# Patient Record
Sex: FEMALE | Race: WHITE | Employment: UNEMPLOYED | URBAN - METROPOLITAN AREA
[De-identification: names, ages, dates, MRNs, and addresses within clinical notes are randomized per-mention and may not be internally consistent; named-entity substitution may affect disease eponyms.]

---

## 2017-02-15 ENCOUNTER — HOSPITAL ENCOUNTER (OUTPATIENT)
Age: 22
Setting detail: SPECIMEN
Discharge: HOME | End: 2017-02-15
Payer: COMMERCIAL

## 2017-02-18 LAB
CULTURE: NORMAL
CULTURE: NORMAL
Lab: NORMAL
SPECIMEN DESCRIPTION: NORMAL
STATUS: NORMAL

## 2018-02-04 ENCOUNTER — APPOINTMENT (OUTPATIENT)
Dept: GENERAL RADIOLOGY | Age: 23
End: 2018-02-04
Payer: MEDICAID

## 2018-02-04 ENCOUNTER — APPOINTMENT (OUTPATIENT)
Dept: CT IMAGING | Age: 23
End: 2018-02-04
Payer: MEDICAID

## 2018-02-04 ENCOUNTER — HOSPITAL ENCOUNTER (EMERGENCY)
Age: 23
Discharge: HOME | End: 2018-02-04
Attending: EMERGENCY MEDICINE
Payer: MEDICAID

## 2018-02-04 VITALS
HEART RATE: 87 BPM | BODY MASS INDEX: 29.09 KG/M2 | OXYGEN SATURATION: 100 % | WEIGHT: 181 LBS | DIASTOLIC BLOOD PRESSURE: 99 MMHG | HEIGHT: 66 IN | RESPIRATION RATE: 16 BRPM | SYSTOLIC BLOOD PRESSURE: 140 MMHG | TEMPERATURE: 98.2 F

## 2018-02-04 DIAGNOSIS — R07.89 ATYPICAL CHEST PAIN: Primary | ICD-10-CM

## 2018-02-04 LAB
ABSOLUTE EOS #: 0.7 K/UL (ref 0–0.4)
ABSOLUTE IMMATURE GRANULOCYTE: ABNORMAL K/UL (ref 0–0.3)
ABSOLUTE LYMPH #: 2.6 K/UL (ref 1–4.8)
ABSOLUTE MONO #: 0.5 K/UL (ref 0.2–0.8)
ANION GAP SERPL CALCULATED.3IONS-SCNC: 12 MMOL/L (ref 9–17)
BASOPHILS # BLD: 0 % (ref 0–2)
BASOPHILS ABSOLUTE: 0 K/UL (ref 0–0.2)
BUN BLDV-MCNC: 12 MG/DL (ref 6–20)
BUN/CREAT BLD: 19 (ref 9–20)
CALCIUM SERPL-MCNC: 9.2 MG/DL (ref 8.6–10.4)
CHLORIDE BLD-SCNC: 102 MMOL/L (ref 98–107)
CHP ED QC CHECK: NORMAL
CO2: 24 MMOL/L (ref 20–31)
CREAT SERPL-MCNC: 0.63 MG/DL (ref 0.5–0.9)
D-DIMER QUANTITATIVE: 0.58 MG/L FEU
DIFFERENTIAL TYPE: ABNORMAL
EKG ATRIAL RATE: 78 BPM
EKG P AXIS: 68 DEGREES
EKG P-R INTERVAL: 154 MS
EKG Q-T INTERVAL: 396 MS
EKG QRS DURATION: 120 MS
EKG QTC CALCULATION (BAZETT): 451 MS
EKG R AXIS: -8 DEGREES
EKG T AXIS: 61 DEGREES
EKG VENTRICULAR RATE: 78 BPM
EOSINOPHILS RELATIVE PERCENT: 7 % (ref 1–4)
GFR AFRICAN AMERICAN: >60 ML/MIN
GFR NON-AFRICAN AMERICAN: >60 ML/MIN
GFR SERPL CREATININE-BSD FRML MDRD: ABNORMAL ML/MIN/{1.73_M2}
GFR SERPL CREATININE-BSD FRML MDRD: ABNORMAL ML/MIN/{1.73_M2}
GLUCOSE BLD-MCNC: 109 MG/DL (ref 70–99)
HCT VFR BLD CALC: 37.3 % (ref 36–46)
HEMOGLOBIN: 12.3 G/DL (ref 12–16)
IMMATURE GRANULOCYTES: ABNORMAL %
LYMPHOCYTES # BLD: 26 % (ref 25–45)
MCH RBC QN AUTO: 28.5 PG (ref 26–34)
MCHC RBC AUTO-ENTMCNC: 33.1 G/DL (ref 31–37)
MCV RBC AUTO: 86.1 FL (ref 80–100)
MONOCYTES # BLD: 5 % (ref 2–8)
NRBC AUTOMATED: ABNORMAL PER 100 WBC
PDW BLD-RTO: 15.5 % (ref 11.5–14.5)
PLATELET # BLD: 390 K/UL (ref 130–400)
PLATELET ESTIMATE: ABNORMAL
PMV BLD AUTO: 7.5 FL (ref 6–12)
POTASSIUM SERPL-SCNC: 3.6 MMOL/L (ref 3.7–5.3)
PREGNANCY TEST URINE, POC: NORMAL
RBC # BLD: 4.33 M/UL (ref 4–5.2)
RBC # BLD: ABNORMAL 10*6/UL
SEG NEUTROPHILS: 62 % (ref 34–64)
SEGMENTED NEUTROPHILS ABSOLUTE COUNT: 6.1 K/UL (ref 1.8–7.7)
SODIUM BLD-SCNC: 138 MMOL/L (ref 135–144)
TROPONIN INTERP: NORMAL
TROPONIN T: <0.03 NG/ML
WBC # BLD: 9.9 K/UL (ref 4.5–13.5)
WBC # BLD: ABNORMAL 10*3/UL

## 2018-02-04 PROCEDURE — 6370000000 HC RX 637 (ALT 250 FOR IP): Performed by: EMERGENCY MEDICINE

## 2018-02-04 PROCEDURE — 81003 URINALYSIS AUTO W/O SCOPE: CPT

## 2018-02-04 PROCEDURE — 84703 CHORIONIC GONADOTROPIN ASSAY: CPT

## 2018-02-04 PROCEDURE — 85025 COMPLETE CBC W/AUTO DIFF WBC: CPT

## 2018-02-04 PROCEDURE — 85379 FIBRIN DEGRADATION QUANT: CPT

## 2018-02-04 PROCEDURE — 93005 ELECTROCARDIOGRAM TRACING: CPT

## 2018-02-04 PROCEDURE — 71046 X-RAY EXAM CHEST 2 VIEWS: CPT

## 2018-02-04 PROCEDURE — 80048 BASIC METABOLIC PNL TOTAL CA: CPT

## 2018-02-04 PROCEDURE — 84484 ASSAY OF TROPONIN QUANT: CPT

## 2018-02-04 PROCEDURE — 6360000004 HC RX CONTRAST MEDICATION: Performed by: EMERGENCY MEDICINE

## 2018-02-04 PROCEDURE — 2580000003 HC RX 258: Performed by: EMERGENCY MEDICINE

## 2018-02-04 PROCEDURE — 71260 CT THORAX DX C+: CPT

## 2018-02-04 PROCEDURE — 99285 EMERGENCY DEPT VISIT HI MDM: CPT

## 2018-02-04 RX ORDER — SODIUM CHLORIDE 0.9 % (FLUSH) 0.9 %
10 SYRINGE (ML) INJECTION PRN
Status: DISCONTINUED | OUTPATIENT
Start: 2018-02-04 | End: 2018-02-04 | Stop reason: HOSPADM

## 2018-02-04 RX ORDER — 0.9 % SODIUM CHLORIDE 0.9 %
50 INTRAVENOUS SOLUTION INTRAVENOUS ONCE
Status: COMPLETED | OUTPATIENT
Start: 2018-02-04 | End: 2018-02-04

## 2018-02-04 RX ORDER — IBUPROFEN 600 MG/1
600 TABLET ORAL EVERY 6 HOURS PRN
Qty: 15 TABLET | Refills: 0 | Status: SHIPPED | OUTPATIENT
Start: 2018-02-04 | End: 2019-05-28

## 2018-02-04 RX ORDER — 0.9 % SODIUM CHLORIDE 0.9 %
500 INTRAVENOUS SOLUTION INTRAVENOUS ONCE
Status: DISCONTINUED | OUTPATIENT
Start: 2018-02-04 | End: 2018-02-04 | Stop reason: HOSPADM

## 2018-02-04 RX ORDER — IBUPROFEN 800 MG/1
800 TABLET ORAL ONCE
Status: COMPLETED | OUTPATIENT
Start: 2018-02-04 | End: 2018-02-04

## 2018-02-04 RX ADMIN — SODIUM CHLORIDE 50 ML: 9 INJECTION, SOLUTION INTRAVENOUS at 15:55

## 2018-02-04 RX ADMIN — IBUPROFEN 800 MG: 800 TABLET, FILM COATED ORAL at 14:10

## 2018-02-04 RX ADMIN — IOPAMIDOL 80 ML: 755 INJECTION, SOLUTION INTRAVENOUS at 15:55

## 2018-02-04 ASSESSMENT — ENCOUNTER SYMPTOMS
SHORTNESS OF BREATH: 1
GASTROINTESTINAL NEGATIVE: 1
WHEEZING: 0
COUGH: 1

## 2018-02-04 ASSESSMENT — PAIN SCALES - GENERAL: PAINLEVEL_OUTOF10: 6

## 2018-02-04 NOTE — ED PROVIDER NOTES
for level 4, 10 or more for level 5)     Review of Systems   HENT: Negative. Respiratory: Positive for cough and shortness of breath. Negative for wheezing. Cardiovascular: Positive for chest pain. Negative for palpitations and leg swelling. Gastrointestinal: Negative. Musculoskeletal: Negative. All other systems reviewed and are negative. Except as noted above the remainder of the review of systems was reviewed and negative. PHYSICAL EXAM    (up to 7 for level 4, 8 or more for level 5)     ED Triage Vitals [02/04/18 1331]   BP Temp Temp Source Pulse Resp SpO2 Height Weight   (!) 140/99 98.2 °F (36.8 °C) Oral 87 16 100 % 5' 6\" (1.676 m) 181 lb (82.1 kg)       Physical Exam   Constitutional: She is oriented to person, place, and time. She appears well-developed and well-nourished. HENT:   Head: Normocephalic. Eyes: EOM are normal.   Neck: Normal range of motion. Neck supple. No thyromegaly present. Cardiovascular: Normal rate and regular rhythm. Pulmonary/Chest: Effort normal. No respiratory distress. She has no wheezes. She has no rales. Abdominal: Soft. There is no tenderness. Musculoskeletal: Normal range of motion. She exhibits no edema. Neurological: She is alert and oriented to person, place, and time. Nursing note and vitals reviewed. DIAGNOSTIC RESULTS     EKG: All EKG's are interpreted by the Emergency Department Physician who either signs or Co-signs this chart in the absence of a cardiologist.    EKG as interpreted myself: Normal sinus rhythm ventricular rate of 78, no acute ST segment abnormalities, normal MS and QT interval, nonspecific interventricular conduction abnormality. RADIOLOGY:   Non-plain film images such as CT, Ultrasound and MRI are read by the radiologist. Plain radiographic images are visualized and preliminarily interpreted by the emergency physician with the below findings:      No results found.   Interpretation per the Radiologist below,

## 2018-02-05 LAB — HCG, PREGNANCY URINE (POC): NEGATIVE

## 2018-02-07 ENCOUNTER — OFFICE VISIT (OUTPATIENT)
Dept: INTERNAL MEDICINE CLINIC | Age: 23
End: 2018-02-07

## 2018-02-07 VITALS
BODY MASS INDEX: 29.63 KG/M2 | HEIGHT: 66 IN | OXYGEN SATURATION: 98 % | DIASTOLIC BLOOD PRESSURE: 80 MMHG | HEART RATE: 84 BPM | SYSTOLIC BLOOD PRESSURE: 120 MMHG | WEIGHT: 184.4 LBS | RESPIRATION RATE: 21 BRPM

## 2018-02-07 DIAGNOSIS — Z72.0 TOBACCO ABUSE: ICD-10-CM

## 2018-02-07 DIAGNOSIS — J45.909 MODERATE ASTHMA WITHOUT COMPLICATION, UNSPECIFIED WHETHER PERSISTENT: Primary | ICD-10-CM

## 2018-02-07 DIAGNOSIS — Z71.6 TOBACCO ABUSE COUNSELING: ICD-10-CM

## 2018-02-07 DIAGNOSIS — F32.A ANXIETY AND DEPRESSION: ICD-10-CM

## 2018-02-07 DIAGNOSIS — F41.9 ANXIETY AND DEPRESSION: ICD-10-CM

## 2018-02-07 PROBLEM — Z3A.36 PREGNANCY WITH 36 COMPLETED WEEKS GESTATION: Status: ACTIVE | Noted: 2017-02-25

## 2018-02-07 PROCEDURE — 99212 OFFICE O/P EST SF 10 MIN: CPT | Performed by: FAMILY MEDICINE

## 2018-02-07 RX ORDER — BUPROPION HYDROCHLORIDE 100 MG/1
100 TABLET ORAL 2 TIMES DAILY
Qty: 60 TABLET | Refills: 3 | Status: SHIPPED | OUTPATIENT
Start: 2018-02-07 | End: 2019-05-28

## 2018-02-07 RX ORDER — FLUTICASONE FUROATE AND VILANTEROL 200; 25 UG/1; UG/1
1 POWDER RESPIRATORY (INHALATION) DAILY
Qty: 1 EACH | Refills: 0 | COMMUNITY
Start: 2018-02-07 | End: 2019-05-28

## 2018-02-07 ASSESSMENT — ENCOUNTER SYMPTOMS
COUGH: 1
EYES NEGATIVE: 1
GASTROINTESTINAL NEGATIVE: 1

## 2018-02-07 NOTE — PROGRESS NOTES
warm and dry. Psychiatric:   Anxiety with underlying depression, panic attack, hyperventilation syndrome. She denies suicidality, delusion or hallucination     Vitals reviewed. Assessment:      1. Moderate asthma without complication, unspecified whether persistent     2. Anxiety and depression     3. Tobacco abuse     4. Tobacco abuse counseling             Plan:      71-year-old female mother of 2 children who recently quit her job to take care of her children is presented for ER follow-up. Patient states that she was seen in the emergency room with subjective complaint of shortness of breath. She had a CTA of the chest that was unremarkable. Tobacco use with history suggestive of asthma. I strongly advised her to quit tobacco, quit date, alternatives to consider. I have placed on bed. An provided sample of inhaled corticosteroids  Anxiety/depression  Panic attack  Hyperventilation syndrome  Insufficient support [place]. Patient stated that she lives with her boyfriend with modest means. Has 2 children the second one was born last year he did she says that she has quit her job and take care of her children. She is in process with Department of job and family services for state benefits. She be scheduled with [facility], comply with Wellbutrin, quit tobacco.  She would benefit from low-fat high-fiber diet, daily moderate exercise, lifestyle change, improved sleep hygiene  She denies excessive alcohol or spontaneous  Recent labs reviewed, discussed with patient question answered  Observe and call for any concern. She may go to the emergency room as necessary.   She may go to Department of job and family services for continuity of care as she wish  This note is created with a voice recognition program and while intend to generate a document that accurately reflects the content of the visit, no guarantee can be provided that every mistake has been identified and corrected by editing.

## 2018-02-07 NOTE — PROGRESS NOTES
routine dental cleaning in the past 6 months? yes -     Have you activated your [employer] account? If not, what are your barriers?  Yes     Patient Care Team:  Em Bocanegra MD as PCP - General (Family Medicine)  Janiya Louise MD as Obstetrician (Perinatology)    Current Tobacco Use    History   Smoking Status    Current Every Day Smoker    Packs/day: 0.50    Types: Cigarettes   Smokeless Tobacco    Never Used     Comment: \"1/2pk/cigs/day\"     Ready to quit: No  Counseling given: Yes     Are you motivated to quit? - no  If yes, have you set a date to quite? - no    Have you tried any anti-smoking aids in the past? -  yes -      1-800-QUIT-NOW ([phone])     Medical History Review  Past Medical, Family, and Social History reviewed and does contribute to the patient presenting condition    Health Maintenance   Topic Date Due    Chlamydia screen  03/11/2019 (Originally 8/17/2017)    Meningococcal (MCV) Vaccine Age 0-22 Years (1 of 1) 03/12/2019 (Originally 3/29/2012)    Flu vaccine (1) 03/12/2019 (Originally 9/1/2017)    DTaP/Tdap/Td vaccine (1 - Tdap) 03/19/2019 (Originally 3/29/2015)    Pneumococcal med risk (1 of 1 - PPSV23) 03/19/2019 (Originally 3/29/2015)    Cervical cancer screen  08/17/2019    HIV screen  Completed

## 2018-06-30 ENCOUNTER — HOSPITAL ENCOUNTER (EMERGENCY)
Age: 23
Discharge: HOME | End: 2018-06-30
Attending: EMERGENCY MEDICINE
Payer: COMMERCIAL

## 2018-06-30 ENCOUNTER — APPOINTMENT (OUTPATIENT)
Dept: GENERAL RADIOLOGY | Age: 23
End: 2018-06-30
Payer: COMMERCIAL

## 2018-06-30 VITALS
WEIGHT: 187.8 LBS | HEART RATE: 85 BPM | HEIGHT: 66 IN | DIASTOLIC BLOOD PRESSURE: 87 MMHG | RESPIRATION RATE: 16 BRPM | BODY MASS INDEX: 30.18 KG/M2 | TEMPERATURE: 98.7 F | OXYGEN SATURATION: 99 % | SYSTOLIC BLOOD PRESSURE: 120 MMHG

## 2018-06-30 DIAGNOSIS — K59.00 CONSTIPATION, UNSPECIFIED CONSTIPATION TYPE: Primary | ICD-10-CM

## 2018-06-30 PROCEDURE — 6370000000 HC RX 637 (ALT 250 FOR IP): Performed by: EMERGENCY MEDICINE

## 2018-06-30 PROCEDURE — 74018 RADEX ABDOMEN 1 VIEW: CPT

## 2018-06-30 PROCEDURE — 99283 EMERGENCY DEPT VISIT LOW MDM: CPT

## 2018-06-30 PROCEDURE — 84703 CHORIONIC GONADOTROPIN ASSAY: CPT

## 2018-06-30 RX ORDER — SODIUM PHOSPHATE, DIBASIC AND SODIUM PHOSPHATE, MONOBASIC 7; 19 G/133ML; G/133ML
118 ENEMA RECTAL ONCE
Status: COMPLETED | OUTPATIENT
Start: 2018-06-30 | End: 2018-06-30

## 2018-06-30 RX ORDER — POLYETHYLENE GLYCOL 3350 17 G/17G
17 POWDER, FOR SOLUTION ORAL DAILY
Qty: 1 BOTTLE | Refills: 0 | Status: SHIPPED | OUTPATIENT
Start: 2018-06-30 | End: 2018-07-07

## 2018-06-30 RX ORDER — DIAZEPAM 5 MG/1
5 TABLET ORAL EVERY 6 HOURS PRN
COMMUNITY
End: 2019-05-28

## 2018-06-30 RX ADMIN — SODIUM PHOSPHATE, DIBASIC AND SODIUM PHOSPHATE, MONOBASIC 1 ENEMA: 7; 19 ENEMA RECTAL at 17:26

## 2018-06-30 ASSESSMENT — PAIN DESCRIPTION - DESCRIPTORS: DESCRIPTORS: PRESSURE

## 2018-06-30 ASSESSMENT — ENCOUNTER SYMPTOMS
CONSTIPATION: 1
EYES NEGATIVE: 1
ALLERGIC/IMMUNOLOGIC NEGATIVE: 1
RESPIRATORY NEGATIVE: 1

## 2018-06-30 ASSESSMENT — PAIN DESCRIPTION - FREQUENCY: FREQUENCY: CONTINUOUS

## 2018-06-30 ASSESSMENT — PAIN SCALES - GENERAL: PAINLEVEL_OUTOF10: 2

## 2018-06-30 ASSESSMENT — PAIN DESCRIPTION - LOCATION: LOCATION: ABDOMEN

## 2018-06-30 ASSESSMENT — PAIN SCALES - WONG BAKER: WONGBAKER_NUMERICALRESPONSE: 2

## 2018-06-30 NOTE — ED PROVIDER NOTES
(Please note that portions of this note were completed with a voice recognition program.  Efforts were made to edit the dictations but occasionally words are mis-transcribed.)    South Wilson MD  Attending Emergency Physician         South Wilson MD  06/30/18 [id number]

## 2018-07-02 LAB
HCG, PREGNANCY URINE (POC): NEGATIVE
HCG, PREGNANCY URINE (POC): NEGATIVE

## 2018-10-26 ENCOUNTER — HOSPITAL ENCOUNTER (EMERGENCY)
Age: 23
Discharge: HOME | End: 2018-10-27
Payer: COMMERCIAL

## 2018-10-26 ENCOUNTER — HOSPITAL ENCOUNTER (EMERGENCY)
Age: 23
Discharge: HOME | End: 2018-10-26
Attending: EMERGENCY MEDICINE
Payer: COMMERCIAL

## 2018-10-26 VITALS
OXYGEN SATURATION: 100 % | WEIGHT: 192.44 LBS | RESPIRATION RATE: 18 BRPM | HEIGHT: 66 IN | DIASTOLIC BLOOD PRESSURE: 78 MMHG | BODY MASS INDEX: 30.93 KG/M2 | HEART RATE: 96 BPM | SYSTOLIC BLOOD PRESSURE: 142 MMHG | TEMPERATURE: 98.5 F

## 2018-10-26 VITALS
WEIGHT: 191.38 LBS | SYSTOLIC BLOOD PRESSURE: 156 MMHG | TEMPERATURE: 99.2 F | HEIGHT: 66 IN | BODY MASS INDEX: 30.76 KG/M2 | HEART RATE: 100 BPM | RESPIRATION RATE: 16 BRPM | OXYGEN SATURATION: 95 % | DIASTOLIC BLOOD PRESSURE: 80 MMHG

## 2018-10-26 DIAGNOSIS — N76.0 BV (BACTERIAL VAGINOSIS): Primary | ICD-10-CM

## 2018-10-26 DIAGNOSIS — A60.00 GENITAL HERPES SIMPLEX, UNSPECIFIED SITE: Primary | ICD-10-CM

## 2018-10-26 DIAGNOSIS — R03.0 ELEVATED BLOOD PRESSURE READING: ICD-10-CM

## 2018-10-26 DIAGNOSIS — B96.89 BV (BACTERIAL VAGINOSIS): Primary | ICD-10-CM

## 2018-10-26 LAB
-: ABNORMAL
AMORPHOUS: ABNORMAL
BACTERIA: ABNORMAL
BILIRUBIN URINE: NEGATIVE
CASTS UA: ABNORMAL /LPF
COLOR: YELLOW
COMMENT UA: ABNORMAL
CRYSTALS, UA: ABNORMAL /HPF
EPITHELIAL CELLS UA: ABNORMAL /HPF (ref 0–5)
GLUCOSE URINE: NEGATIVE
KETONES, URINE: NEGATIVE
LEUKOCYTE ESTERASE, URINE: ABNORMAL
MUCUS: ABNORMAL
NITRITE, URINE: NEGATIVE
OTHER OBSERVATIONS UA: ABNORMAL
PH UA: 6 (ref 5–8)
PROTEIN UA: ABNORMAL
RBC UA: ABNORMAL /HPF (ref 0–2)
RENAL EPITHELIAL, UA: ABNORMAL /HPF
SPECIFIC GRAVITY UA: 1.02 (ref 1–1.03)
TRICHOMONAS: ABNORMAL
TURBIDITY: CLEAR
URINE HGB: ABNORMAL
UROBILINOGEN, URINE: NORMAL
WBC UA: ABNORMAL /HPF (ref 0–5)
YEAST: ABNORMAL

## 2018-10-26 PROCEDURE — 84703 CHORIONIC GONADOTROPIN ASSAY: CPT

## 2018-10-26 PROCEDURE — 87491 CHLMYD TRACH DNA AMP PROBE: CPT

## 2018-10-26 PROCEDURE — 87086 URINE CULTURE/COLONY COUNT: CPT

## 2018-10-26 PROCEDURE — 87480 CANDIDA DNA DIR PROBE: CPT

## 2018-10-26 PROCEDURE — 87660 TRICHOMONAS VAGIN DIR PROBE: CPT

## 2018-10-26 PROCEDURE — 87255 GENET VIRUS ISOLATE HSV: CPT

## 2018-10-26 PROCEDURE — 87510 GARDNER VAG DNA DIR PROBE: CPT

## 2018-10-26 PROCEDURE — 99283 EMERGENCY DEPT VISIT LOW MDM: CPT

## 2018-10-26 PROCEDURE — 81001 URINALYSIS AUTO W/SCOPE: CPT

## 2018-10-26 PROCEDURE — 87015 SPECIMEN INFECT AGNT CONCNTJ: CPT

## 2018-10-26 PROCEDURE — 87591 N.GONORRHOEAE DNA AMP PROB: CPT

## 2018-10-26 RX ORDER — ACYCLOVIR 200 MG/1
200 CAPSULE ORAL
Qty: 50 CAPSULE | Refills: 0 | Status: SHIPPED | OUTPATIENT
Start: 2018-10-26 | End: 2018-11-05

## 2018-10-26 ASSESSMENT — ENCOUNTER SYMPTOMS
CONSTIPATION: 0
DIARRHEA: 0
NAUSEA: 0
VOMITING: 0
SHORTNESS OF BREATH: 0
ABDOMINAL PAIN: 0
COLOR CHANGE: 0
SORE THROAT: 0
SINUS PRESSURE: 0
RHINORRHEA: 0
WHEEZING: 0
COUGH: 0

## 2018-10-26 ASSESSMENT — PAIN DESCRIPTION - DESCRIPTORS: DESCRIPTORS: ACHING

## 2018-10-26 ASSESSMENT — PAIN DESCRIPTION - PAIN TYPE
TYPE: ACUTE PAIN
TYPE: ACUTE PAIN

## 2018-10-26 ASSESSMENT — PAIN SCALES - GENERAL
PAINLEVEL_OUTOF10: 2
PAINLEVEL_OUTOF10: 7

## 2018-10-26 NOTE — ED PROVIDER NOTES
[address] [place] [address] ED  eMERGENCY dEPARTMENT eNCOUnter      Pt Name: He Gonzales  MRN: [id number]  [place] 1995  Date of evaluation: 10/26/2018  Provider: Kalina Adames NP, CARLEEN - [facility] [address]       Chief Complaint   Patient presents with    Vaginal Itching         HISTORY OF PRESENT ILLNESS  (Location/Symptom, Timing/Onset, Context/Setting, Quality, Duration, Modifying Factors, Severity.)   He Gonzales is a 26 y.o. female who presents to the emergency department By private vehicle for evaluation of open sores to the shoulder area that are tender to palpation. The patient states last couple of patient's tetanus is open sores to her vagina that are tender. She describes it as a burning pain. She rates the pain a 7 year to 10 scale. No history of previous lesions in the past.      Nursing Notes were reviewed. ALLERGIES     Patient has no known allergies. CURRENT MEDICATIONS       Discharge Medication List as of 10/26/2018 10:38 AM      CONTINUE these medications which have NOT CHANGED    Details   diazepam (VALIUM) 5 MG tablet Take 5 mg by mouth every 6 hours as needed for Anxiety. Almer Marisa Historical Med      buPROPion (WELLBUTRIN) 100 MG tablet Take 1 tablet by mouth 2 times daily, Disp-60 tablet, R-3Normal      Fluticasone Furoate-Vilanterol (BREO ELLIPTA) 200-25 MCG/INH AEPB Inhale 1 puff into the lungs daily, Disp-1 each, [id number]  05/2019  [id number]      ibuprofen (ADVIL;MOTRIN) 600 MG tablet Take 1 tablet by mouth every 6 hours as needed for Pain, Disp-15 tablet, R-0Print      loratadine (CLARITIN) 10 MG tablet Take 1 tablet by mouth daily, Disp-30 tablet, R-0             PAST MEDICAL HISTORY   History reviewed. No pertinent past medical history. SURGICAL HISTORY           Procedure Laterality Date    TONSILLECTOMY      WISDOM TOOTH EXTRACTION           FAMILY HISTORY     History reviewed. No pertinent family history.   No family status information on Neurological: She is alert and oriented to person, place, and time. Skin: Skin is warm and dry. No rash noted. Psychiatric: She has a normal mood and affect. Vitals reviewed. LABS:  Labs Reviewed   HERPES SIMPLEX VIRUS CULTURE       All other labs were within normal range or not returned as of this dictation. EMERGENCY DEPARTMENT COURSE and DIFFERENTIAL DIAGNOSIS/MDM:   Vitals:    Vitals:    10/26/18 0952   BP: (!) 142/78   Pulse: 96   Resp: 18   Temp: 98.5 °F (36.9 °C)   SpO2: 100%   Weight: 192 lb 7 oz (87.3 kg)   Height: 5' 6\" (1.676 m)       Medical Decision Making: Discharge home on a acylcovir and some viscous lidocaine for discomfort to the area. Follow-up with primary care physician for recheck and reevaluation. Herpes culture is pending although there is a very high clinical suspicion for herpes. She was told that if her herpes culture comes back negative that she could stop taking the medication. FINAL IMPRESSION      1.  Genital herpes simplex, unspecified site          DISPOSITION/PLAN   DISPOSITION Decision To Discharge 10/26/2018 10:22:45 AM      PATIENT REFERRED TO:   Hu Valentino MD  [address] [address]  [phone]    Call in 2 days      [facility] ED  [address] [facility]  [phone]    If symptoms worsen      DISCHARGE MEDICATIONS:     Discharge Medication List as of 10/26/2018 10:38 AM      START taking these medications    Details   acyclovir (ZOVIRAX) 200 MG capsule Take 1 capsule by mouth 5 times daily for 10 days, Disp-50 capsule, R-0Print      lidocaine viscous (XYLOCAINE) 2 % solution Take 15 mLs by mouth as needed for Irritation To genital area, Disp-100 mL, R-0Print                 (Please note that portions of this note were completed with a voice recognition program.  Efforts were made to edit the dictations but occasionally words are mis-transcribed.)    [facility] NP, APRN - CNP  Certified Nurse

## 2018-10-27 LAB
CHP ED QC CHECK: NORMAL
CULTURE: ABNORMAL
CULTURE: ABNORMAL
DIRECT EXAM: ABNORMAL
Lab: ABNORMAL
Lab: ABNORMAL
PREGNANCY TEST URINE, POC: NEGATIVE
SPECIMEN DESCRIPTION: ABNORMAL
SPECIMEN DESCRIPTION: ABNORMAL
STATUS: ABNORMAL
STATUS: ABNORMAL

## 2018-10-27 RX ORDER — METRONIDAZOLE 500 MG/1
500 TABLET ORAL 2 TIMES DAILY
Qty: 14 TABLET | Refills: 0 | Status: SHIPPED | OUTPATIENT
Start: 2018-10-27 | End: 2018-11-03

## 2018-10-27 NOTE — ED NOTES
Pt was seen and tx earlier today. Pt states that since she was here she developed a foul-smelling discharge. Pt is A&O and NAD.       Haseeb Mullins RN  10/26/18 8471

## 2018-10-27 NOTE — ED PROVIDER NOTES
(Please note that portions of this note were completed with a voice recognition program.  Efforts were made to edit thedictations but occasionally words are mis-transcribed.)    WILLIAMS Massey PA-C  10/27/18 2313

## 2018-10-28 LAB
CULTURE: NORMAL
Lab: NORMAL
SPECIMEN DESCRIPTION: NORMAL
STATUS: NORMAL

## 2018-10-29 LAB
C TRACH DNA GENITAL QL NAA+PROBE: NEGATIVE
HCG, PREGNANCY URINE (POC): NEGATIVE
N. GONORRHOEAE DNA: NEGATIVE

## 2018-11-15 ENCOUNTER — OFFICE VISIT (OUTPATIENT)
Dept: INTERNAL MEDICINE CLINIC | Age: 23
End: 2018-11-15
Payer: COMMERCIAL

## 2018-11-15 VITALS
SYSTOLIC BLOOD PRESSURE: 110 MMHG | WEIGHT: 189.6 LBS | DIASTOLIC BLOOD PRESSURE: 70 MMHG | BODY MASS INDEX: 30.47 KG/M2 | RESPIRATION RATE: 24 BRPM | OXYGEN SATURATION: 95 % | HEIGHT: 66 IN | HEART RATE: 85 BPM

## 2018-11-15 DIAGNOSIS — Z72.0 TOBACCO ABUSE: ICD-10-CM

## 2018-11-15 DIAGNOSIS — Z86.19 HISTORY OF HERPES GENITALIS: ICD-10-CM

## 2018-11-15 DIAGNOSIS — F41.8 ANXIETY ABOUT HEALTH: Primary | ICD-10-CM

## 2018-11-15 DIAGNOSIS — Z20.2 STD EXPOSURE: ICD-10-CM

## 2018-11-15 DIAGNOSIS — Z70.8 COUNSELING FOR SEXUALLY TRANSMITTED DISEASE: ICD-10-CM

## 2018-11-15 PROBLEM — Z3A.36 PREGNANCY WITH 36 COMPLETED WEEKS GESTATION: Status: RESOLVED | Noted: 2017-02-25 | Resolved: 2018-11-15

## 2018-11-15 PROCEDURE — G8417 CALC BMI ABV UP PARAM F/U: HCPCS | Performed by: FAMILY MEDICINE

## 2018-11-15 PROCEDURE — 4004F PT TOBACCO SCREEN RCVD TLK: CPT | Performed by: FAMILY MEDICINE

## 2018-11-15 PROCEDURE — G8484 FLU IMMUNIZE NO ADMIN: HCPCS | Performed by: FAMILY MEDICINE

## 2018-11-15 PROCEDURE — G8427 DOCREV CUR MEDS BY ELIG CLIN: HCPCS | Performed by: FAMILY MEDICINE

## 2018-11-15 PROCEDURE — 99213 OFFICE O/P EST LOW 20 MIN: CPT | Performed by: FAMILY MEDICINE

## 2018-11-15 ASSESSMENT — PATIENT HEALTH QUESTIONNAIRE - PHQ9
2. FEELING DOWN, DEPRESSED OR HOPELESS: 0
SUM OF ALL RESPONSES TO PHQ QUESTIONS 1-9: 0
1. LITTLE INTEREST OR PLEASURE IN DOING THINGS: 0
SUM OF ALL RESPONSES TO PHQ QUESTIONS 1-9: 0
SUM OF ALL RESPONSES TO PHQ9 QUESTIONS 1 & 2: 0

## 2018-11-15 ASSESSMENT — ENCOUNTER SYMPTOMS
EYES NEGATIVE: 1
ALLERGIC/IMMUNOLOGIC NEGATIVE: 1
RESPIRATORY NEGATIVE: 1
GASTROINTESTINAL NEGATIVE: 1

## 2018-11-15 NOTE — PROGRESS NOTES
Chronic Disease Visit Information    BP Readings from Last 3 Encounters:   11/15/18 110/70   10/26/18 (!) 156/80   10/26/18 (!) 142/78          BUN (mg/dL)   Date Value   02/04/2018 12     CREATININE (mg/dL)   Date Value   02/04/2018 0.63     Glucose (mg/dL)   Date Value   02/04/2018 109 (H)            Have you changed or started any medications since your last visit including any over-the-counter medicines, vitamins, or herbal medicines? no   Are you having any side effects from any of your medications? -  no  Have you stopped taking any of your medications? Is so, why? -  no    Have you seen any other physician or provider since your last visit? No  Have you had any other diagnostic tests since your last visit? No  Have you been seen in the emergency room and/or had an admission to a hospital since we last saw you? yes  Have you had your annual diabetic retinal (eye) exam? No  Have you had your routine dental cleaning in the past 6 months? no    Have you activated your [employer] account? If not, what are your barriers?  Yes     Patient Care Team:  Samantha Guardado MD as PCP - General (Family Medicine)  Adriana Costa MD as Obstetrician (Perinatology)         Medical History Review  Past Medical, Family, and Social History reviewed and does not contribute to the patient presenting condition    Health Maintenance   Topic Date Due    Flu vaccine (1) 03/12/2019 (Originally 9/1/2018)    DTaP/Tdap/Td vaccine (1 - Tdap) 03/19/2019 (Originally 3/29/2015)    Pneumococcal med risk (1 of 1 - PPSV23) 03/19/2019 (Originally 3/29/2015)    Cervical cancer screen  08/17/2019    Chlamydia screen  10/26/2019    Meningococcal (MCV) Vaccine Age 0-22 Years  Aged Out    HIV screen  Completed

## 2019-05-28 ENCOUNTER — HOSPITAL ENCOUNTER (EMERGENCY)
Age: 24
Discharge: HOME | End: 2019-05-28
Attending: EMERGENCY MEDICINE
Payer: COMMERCIAL

## 2019-05-28 ENCOUNTER — APPOINTMENT (OUTPATIENT)
Dept: ULTRASOUND IMAGING | Age: 24
End: 2019-05-28
Payer: COMMERCIAL

## 2019-05-28 VITALS
DIASTOLIC BLOOD PRESSURE: 75 MMHG | HEIGHT: 66 IN | BODY MASS INDEX: 31.56 KG/M2 | OXYGEN SATURATION: 100 % | SYSTOLIC BLOOD PRESSURE: 146 MMHG | WEIGHT: 196.38 LBS | HEART RATE: 104 BPM | RESPIRATION RATE: 16 BRPM | TEMPERATURE: 98.2 F

## 2019-05-28 DIAGNOSIS — N83.201 CYST OF RIGHT OVARY: Primary | ICD-10-CM

## 2019-05-28 LAB
DIRECT EXAM: NORMAL
HCG, PREGNANCY URINE (POC): NEGATIVE
Lab: NORMAL
SPECIMEN DESCRIPTION: NORMAL

## 2019-05-28 PROCEDURE — 87480 CANDIDA DNA DIR PROBE: CPT

## 2019-05-28 PROCEDURE — 87491 CHLMYD TRACH DNA AMP PROBE: CPT

## 2019-05-28 PROCEDURE — 81003 URINALYSIS AUTO W/O SCOPE: CPT

## 2019-05-28 PROCEDURE — 84703 CHORIONIC GONADOTROPIN ASSAY: CPT

## 2019-05-28 PROCEDURE — 87591 N.GONORRHOEAE DNA AMP PROB: CPT

## 2019-05-28 PROCEDURE — 96372 THER/PROPH/DIAG INJ SC/IM: CPT

## 2019-05-28 PROCEDURE — 6360000002 HC RX W HCPCS: Performed by: EMERGENCY MEDICINE

## 2019-05-28 PROCEDURE — 76856 US EXAM PELVIC COMPLETE: CPT

## 2019-05-28 PROCEDURE — 93976 VASCULAR STUDY: CPT

## 2019-05-28 PROCEDURE — 87660 TRICHOMONAS VAGIN DIR PROBE: CPT

## 2019-05-28 PROCEDURE — 99284 EMERGENCY DEPT VISIT MOD MDM: CPT

## 2019-05-28 PROCEDURE — 87510 GARDNER VAG DNA DIR PROBE: CPT

## 2019-05-28 PROCEDURE — 76830 TRANSVAGINAL US NON-OB: CPT

## 2019-05-28 RX ORDER — HYDROCODONE BITARTRATE AND ACETAMINOPHEN 5; 325 MG/1; MG/1
1 TABLET ORAL EVERY 4 HOURS PRN
Qty: 10 TABLET | Refills: 0 | Status: SHIPPED | OUTPATIENT
Start: 2019-05-28 | End: 2019-05-31

## 2019-05-28 RX ORDER — MORPHINE SULFATE 4 MG/ML
4 INJECTION, SOLUTION INTRAMUSCULAR; INTRAVENOUS ONCE
Status: COMPLETED | OUTPATIENT
Start: 2019-05-28 | End: 2019-05-28

## 2019-05-28 RX ADMIN — MORPHINE SULFATE 4 MG: 4 INJECTION INTRAVENOUS at 03:13

## 2019-05-28 ASSESSMENT — PAIN SCALES - GENERAL
PAINLEVEL_OUTOF10: 3
PAINLEVEL_OUTOF10: 3
PAINLEVEL_OUTOF10: 7
PAINLEVEL_OUTOF10: 7

## 2019-05-28 ASSESSMENT — ENCOUNTER SYMPTOMS
CHOKING: 0
WHEEZING: 0
STRIDOR: 0
SORE THROAT: 0
VOMITING: 0
COUGH: 0
ABDOMINAL PAIN: 0
NAUSEA: 0
EYE PAIN: 0

## 2019-05-28 NOTE — ED NOTES
Discharge instructions reviewed with pt and medications discussed. Also discussed the need for follow up with obgyn to monitor her care. Pt had no further questions or concerns.       Nedra Lee RN  05/28/19 0394

## 2019-05-28 NOTE — ED NOTES
Pt ambulatory to tx room w c/o vaginal/groin pain. Pt states that the pain was brought on after intercourse today. Pt denies any new items used in the bedroom and states that everything was the same as usual. Urine sample collected and dipped. Results are on paper chart.       Jay Darling RN  05/28/19 4695

## 2019-05-28 NOTE — ED PROVIDER NOTES
Alcohol/week: 0.0 oz    Drug use: No     PHYSICAL EXAM     INITIAL VITALS:   Vitals:    05/28/19 0057 05/28/19 0100   BP: (!) 146/75    Pulse: 104    Resp: 16    Temp: 98.2 °F (36.8 °C)    TempSrc: Oral    SpO2: 100%    Weight:  196 lb 6 oz (89.1 kg)   Height:  5' 6\" (1.676 m)       Physical Exam   Constitutional: She is oriented to person, place, and time. She appears well-developed and well-nourished. HENT:   Head: Normocephalic and atraumatic. Eyes: Conjunctivae and EOM are normal.   Neck: Normal range of motion. Neck supple. Cardiovascular: Normal rate and regular rhythm. Pulmonary/Chest: Effort normal and breath sounds normal.   Abdominal: Soft. She exhibits no mass. There is no tenderness. Genitourinary: Cervix exhibits no motion tenderness, no discharge and no friability. Right adnexum displays no mass, no tenderness and no fullness. Left adnexum displays tenderness. Left adnexum displays no mass and no fullness. Musculoskeletal: Normal range of motion. She exhibits no edema, tenderness or deformity. Neurological: She is alert and oriented to person, place, and time. Skin: Skin is warm and dry. MEDICAL DECISION MAKING:     Evaluation of acute pelvic pain in a nonpregnant young female with history of ovarian cysts. Ultrasound of the pelvis does not show any ovarian torsion. Ultrasound shows 3.3 cm ovarian cyst on the right adnexa. Patient will follow up with ObGyn for further evaluation and surveillance imaging as needed. CRITICAL CARE:       PROCEDURES:    Procedures    DIAGNOSTIC RESULTS   EKG:All EKG's are interpreted by the Emergency Department Physician who either signs or Co-signs this chart in the absence of a cardiologist.        RADIOLOGY:All plain film, CT, MRI, and formal ultrasound images (except ED bedside ultrasound) are read by the radiologist, see reports below, unless otherwisenoted in MDM or here.   US PELVIS COMPLETE   Final Result   No evidence of ovarian torsion. Simple 3.3 cm right ovarian cyst.  No imaging follow-up recommended. Uterine fundal cleft may relate to bicornuate or septate uterus. Endometrium   measures 1.6 cm in thickness. Small amount of complex pelvic free fluid. US NON OB TRANSVAGINAL   Final Result   No evidence of ovarian torsion. Simple 3.3 cm right ovarian cyst.  No imaging follow-up recommended. Uterine fundal cleft may relate to bicornuate or septate uterus. Endometrium   measures 1.6 cm in thickness. Small amount of complex pelvic free fluid. US DUP ABD PEL RETRO SCROT LIMITED   Final Result   No evidence of ovarian torsion. Simple 3.3 cm right ovarian cyst.  No imaging follow-up recommended. Uterine fundal cleft may relate to bicornuate or septate uterus. Endometrium   measures 1.6 cm in thickness. Small amount of complex pelvic free fluid. LABS: All lab results were reviewed by myself, and all abnormals are listed below. Labs Reviewed   VAGINITIS DNA PROBE   C.TRACHOMATIS N.GONORRHOEAE DNA       EMERGENCY DEPARTMENTCOURSE:         Vitals:    Vitals:    05/28/19 0057 05/28/19 0100   BP: (!) 146/75    Pulse: 104    Resp: 16    Temp: 98.2 °F (36.8 °C)    TempSrc: Oral    SpO2: 100%    Weight:  196 lb 6 oz (89.1 kg)   Height:  5' 6\" (1.676 m)       The patient was given the following medications while in the emergency department:  Orders Placed This Encounter   Medications    morphine sulfate (PF) injection 4 mg    HYDROcodone-acetaminophen (NORCO) 5-325 MG per tablet     Sig: Take 1 tablet by mouth every 4 hours as needed for Pain for up to 3 days. Intended supply: 3 days. Take lowest dose possible to manage pain     Dispense:  10 tablet     Refill:  0     CONSULTS:  None    FINAL IMPRESSION      1.  Cyst of right ovary          DISPOSITION/PLAN   DISPOSITION        PATIENT REFERRED TO:  Nick Hirsch MD  [address] [facility]

## 2019-05-28 NOTE — LETTER
[facility] ED  \[facility]\"" [address]  Phone: [phone]             May 28, 2019    Patient: Vincenza Schaumann   YOB: 1995   Date of Visit: 5/28/2019       To Whom It May Concern:    Vincenza Schaumann was seen and treated in our emergency department on 5/28/2019. Please excuse her from work on 5/28/2019.       Sincerely,       Judy Castorena RN          Signature:__________________________________

## 2019-05-29 LAB
C TRACH DNA GENITAL QL NAA+PROBE: NEGATIVE
N. GONORRHOEAE DNA: NEGATIVE
SPECIMEN DESCRIPTION: NORMAL

## 2019-09-03 ENCOUNTER — HOSPITAL ENCOUNTER (OUTPATIENT)
Age: 24
Setting detail: SPECIMEN
Discharge: HOME | End: 2019-09-03
Payer: COMMERCIAL

## 2019-09-03 ENCOUNTER — OFFICE VISIT (OUTPATIENT)
Dept: INTERNAL MEDICINE CLINIC | Age: 24
End: 2019-09-03
Payer: COMMERCIAL

## 2019-09-03 VITALS
SYSTOLIC BLOOD PRESSURE: 120 MMHG | HEART RATE: 51 BPM | WEIGHT: 195.4 LBS | HEIGHT: 66 IN | OXYGEN SATURATION: 98 % | TEMPERATURE: 99 F | BODY MASS INDEX: 31.4 KG/M2 | DIASTOLIC BLOOD PRESSURE: 60 MMHG

## 2019-09-03 DIAGNOSIS — F41.9 ANXIETY WITH SOMATIZATION: ICD-10-CM

## 2019-09-03 DIAGNOSIS — Z20.2 POSSIBLE EXPOSURE TO STD: ICD-10-CM

## 2019-09-03 DIAGNOSIS — Z32.00 POSSIBLE PREGNANCY, NOT YET CONFIRMED: Primary | ICD-10-CM

## 2019-09-03 DIAGNOSIS — F41.0 ANXIETY ATTACK: ICD-10-CM

## 2019-09-03 DIAGNOSIS — F45.0 ANXIETY WITH SOMATIZATION: ICD-10-CM

## 2019-09-03 DIAGNOSIS — Z32.00 POSSIBLE PREGNANCY, NOT YET CONFIRMED: ICD-10-CM

## 2019-09-03 LAB
HAV IGM SER IA-ACNC: NONREACTIVE
HCG QUANTITATIVE: <1 IU/L
HEPATITIS B CORE IGM ANTIBODY: NONREACTIVE
HEPATITIS B SURFACE ANTIGEN: NONREACTIVE
HEPATITIS C ANTIBODY: NONREACTIVE

## 2019-09-03 PROCEDURE — G8427 DOCREV CUR MEDS BY ELIG CLIN: HCPCS | Performed by: FAMILY MEDICINE

## 2019-09-03 PROCEDURE — 4004F PT TOBACCO SCREEN RCVD TLK: CPT | Performed by: FAMILY MEDICINE

## 2019-09-03 PROCEDURE — G8417 CALC BMI ABV UP PARAM F/U: HCPCS | Performed by: FAMILY MEDICINE

## 2019-09-03 PROCEDURE — 99213 OFFICE O/P EST LOW 20 MIN: CPT | Performed by: FAMILY MEDICINE

## 2019-09-03 ASSESSMENT — PATIENT HEALTH QUESTIONNAIRE - PHQ9
1. LITTLE INTEREST OR PLEASURE IN DOING THINGS: 0
SUM OF ALL RESPONSES TO PHQ9 QUESTIONS 1 & 2: 0
SUM OF ALL RESPONSES TO PHQ QUESTIONS 1-9: 0
SUM OF ALL RESPONSES TO PHQ QUESTIONS 1-9: 0
2. FEELING DOWN, DEPRESSED OR HOPELESS: 0

## 2019-09-05 LAB
C. TRACHOMATIS DNA ,URINE: NEGATIVE
N. GONORRHOEAE DNA, URINE: NEGATIVE
SPECIMEN DESCRIPTION: NORMAL

## 2019-09-06 LAB — VDRL, QUANTITATIVE: NEGATIVE

## 2019-09-26 ENCOUNTER — HOSPITAL ENCOUNTER (EMERGENCY)
Age: 24
Discharge: HOME | End: 2019-09-26
Attending: EMERGENCY MEDICINE
Payer: COMMERCIAL

## 2019-09-26 VITALS
DIASTOLIC BLOOD PRESSURE: 87 MMHG | BODY MASS INDEX: 31.15 KG/M2 | HEART RATE: 106 BPM | SYSTOLIC BLOOD PRESSURE: 152 MMHG | WEIGHT: 193.8 LBS | TEMPERATURE: 98.4 F | HEIGHT: 66 IN | OXYGEN SATURATION: 97 % | RESPIRATION RATE: 18 BRPM

## 2019-09-26 DIAGNOSIS — K08.89 PAIN, DENTAL: Primary | ICD-10-CM

## 2019-09-26 PROCEDURE — 99282 EMERGENCY DEPT VISIT SF MDM: CPT

## 2019-09-26 RX ORDER — PENICILLIN V POTASSIUM 500 MG/1
500 TABLET ORAL 4 TIMES DAILY
Qty: 40 TABLET | Refills: 0 | Status: SHIPPED | OUTPATIENT
Start: 2019-09-26 | End: 2019-10-06

## 2019-09-26 ASSESSMENT — ENCOUNTER SYMPTOMS
SORE THROAT: 0
VOICE CHANGE: 0
TROUBLE SWALLOWING: 0
FACIAL SWELLING: 0
COLOR CHANGE: 0

## 2019-09-26 NOTE — ED PROVIDER NOTES
eMERGENCY dEPARTMENT eNCOUnter   [address] Name: Briana Ramirez  MRN: [id number]  [place] 1995  Date of evaluation: 9/26/19     Briana Ramirez is a 22 y.o. female with CC: Oral Swelling (gum discoloration X 1 year with pain 5/10 today)      Based on the medical record the care appears appropriate. I was personally available for consultation in the Emergency Department.     Michelle Crum MD  Attending Emergency Physician                    Michelle Crum MD  09/26/19 7943

## 2020-05-01 ENCOUNTER — HOSPITAL ENCOUNTER (EMERGENCY)
Age: 25
Discharge: HOME | End: 2020-05-01
Attending: EMERGENCY MEDICINE
Payer: COMMERCIAL

## 2020-05-01 ENCOUNTER — APPOINTMENT (OUTPATIENT)
Dept: GENERAL RADIOLOGY | Age: 25
End: 2020-05-01
Payer: COMMERCIAL

## 2020-05-01 VITALS
HEART RATE: 92 BPM | RESPIRATION RATE: 16 BRPM | SYSTOLIC BLOOD PRESSURE: 138 MMHG | HEIGHT: 66 IN | WEIGHT: 180 LBS | OXYGEN SATURATION: 97 % | BODY MASS INDEX: 28.93 KG/M2 | TEMPERATURE: 98.9 F | DIASTOLIC BLOOD PRESSURE: 70 MMHG

## 2020-05-01 PROCEDURE — 6370000000 HC RX 637 (ALT 250 FOR IP): Performed by: EMERGENCY MEDICINE

## 2020-05-01 PROCEDURE — 72040 X-RAY EXAM NECK SPINE 2-3 VW: CPT

## 2020-05-01 PROCEDURE — 6360000002 HC RX W HCPCS: Performed by: EMERGENCY MEDICINE

## 2020-05-01 PROCEDURE — 96372 THER/PROPH/DIAG INJ SC/IM: CPT

## 2020-05-01 PROCEDURE — 99283 EMERGENCY DEPT VISIT LOW MDM: CPT

## 2020-05-01 RX ORDER — KETOROLAC TROMETHAMINE 30 MG/ML
30 INJECTION, SOLUTION INTRAMUSCULAR; INTRAVENOUS ONCE
Status: COMPLETED | OUTPATIENT
Start: 2020-05-01 | End: 2020-05-01

## 2020-05-01 RX ORDER — CYCLOBENZAPRINE HCL 10 MG
10 TABLET ORAL ONCE
Status: COMPLETED | OUTPATIENT
Start: 2020-05-01 | End: 2020-05-01

## 2020-05-01 RX ORDER — ACETAMINOPHEN 500 MG
1000 TABLET ORAL ONCE
Status: COMPLETED | OUTPATIENT
Start: 2020-05-01 | End: 2020-05-01

## 2020-05-01 RX ORDER — CYCLOBENZAPRINE HCL 5 MG
5 TABLET ORAL 2 TIMES DAILY PRN
Qty: 20 TABLET | Refills: 0 | Status: SHIPPED | OUTPATIENT
Start: 2020-05-01 | End: 2020-05-11

## 2020-05-01 RX ADMIN — KETOROLAC TROMETHAMINE 30 MG: 30 INJECTION, SOLUTION INTRAMUSCULAR at 17:19

## 2020-05-01 RX ADMIN — ACETAMINOPHEN 1000 MG: 500 TABLET ORAL at 17:19

## 2020-05-01 RX ADMIN — CYCLOBENZAPRINE HYDROCHLORIDE 10 MG: 10 TABLET, FILM COATED ORAL at 17:19

## 2020-05-01 ASSESSMENT — PAIN DESCRIPTION - DESCRIPTORS: DESCRIPTORS: SHARP

## 2020-05-01 ASSESSMENT — PAIN SCALES - GENERAL
PAINLEVEL_OUTOF10: 6
PAINLEVEL_OUTOF10: 6

## 2020-05-01 ASSESSMENT — PAIN DESCRIPTION - LOCATION: LOCATION: NECK

## 2020-05-01 ASSESSMENT — PAIN DESCRIPTION - FREQUENCY: FREQUENCY: INTERMITTENT

## 2020-05-01 NOTE — ED PROVIDER NOTES
EMERGENCY DEPARTMENT ENCOUNTER    Pt Name: Tara Smith  MRN: [id number]  [place] 1995  Date of evaluation: 5/1/20  CHIEF COMPLAINT       Chief Complaint   Patient presents with    Neck Pain     onset 1 yr, worse past 3 weeks, PCP appt 5/5     HISTORY OF PRESENT ILLNESS   HPI  25F with no pmh who presents with L neck pain that has been ongoing for about 1 year but worse over the past few weeks. Pt denies any inciting trauma, began having episodes of pain in her L neck about 1 year ago, it comes and goes, sometimes worse with neck movement, feels like a sharp pain that will radiate to the base of her skull. Pt states it lasts a few seconds to minutes at a time, not associated with CP, SOB, N/V or any other symptoms. She denies cough, fever/chills, weakness/numbness or any other acute complaints. States that she has not seen a doctor for this yet, called her pcp, has an appointment for 4 days from now but states the pain is so bad she came to ED. REVIEW OF SYSTEMS     Review of Systems   All other systems reviewed and are negative. PASTMEDICAL HISTORY   History reviewed. No pertinent past medical history. SURGICAL HISTORY       Past Surgical History:   Procedure Laterality Date    TONSILLECTOMY      WISDOM TOOTH EXTRACTION       CURRENT MEDICATIONS       Previous Medications    No medications on file     ALLERGIES     has No Known Allergies. FAMILY HISTORY     has no family status information on file.       SOCIAL HISTORY       Social History     Tobacco Use    Smoking status: Current Every Day Smoker     Packs/day: 0.50     Types: Cigarettes    Smokeless tobacco: Never Used    Tobacco comment: \"1/2pk/cigs/day\"   Substance Use Topics    Alcohol use: No     Alcohol/week: 0.0 standard drinks    Drug use: No     PHYSICAL EXAM     INITIAL VITALS: /70   Pulse 92   Temp 98.9 °F (37.2 °C) (Oral)   Resp 16   Ht 5' 6\" (1.676 m)   Wt 180 lb (81.6 kg)   LMP 04/24/2020   SpO2 97%   BMI 29.05 kg/m²    Physical Exam  Constitutional:       General: She is not in acute distress. Appearance: Normal appearance. She is normal weight. HENT:      Head: Normocephalic and atraumatic. Right Ear: Tympanic membrane and external ear normal.      Left Ear: Tympanic membrane and external ear normal.      Nose: Nose normal.      Mouth/Throat:      Mouth: Mucous membranes are moist.   Eyes:      Extraocular Movements: Extraocular movements intact. Conjunctiva/sclera: Conjunctivae normal.      Pupils: Pupils are equal, round, and reactive to light. Neck:      Musculoskeletal: Normal range of motion and neck supple. No neck rigidity. Comments: No midline tenderness or step-off, mild tenderness of the neck L of midline, no skin changes to neck  Cardiovascular:      Rate and Rhythm: Normal rate and regular rhythm. Pulses: Normal pulses. Heart sounds: Normal heart sounds. No murmur. Pulmonary:      Effort: Pulmonary effort is normal. No respiratory distress. Breath sounds: Normal breath sounds. No wheezing or rhonchi. Abdominal:      General: Bowel sounds are normal. There is no distension. Palpations: Abdomen is soft. There is no mass. Tenderness: There is no abdominal tenderness. There is no right CVA tenderness, left CVA tenderness, guarding or rebound. Musculoskeletal:         General: No swelling or deformity. Skin:     Capillary Refill: Capillary refill takes less than 2 seconds. Coloration: Skin is not jaundiced. Findings: No bruising, erythema or rash. Neurological:      General: No focal deficit present. Mental Status: She is alert and oriented to person, place, and time. Cranial Nerves: No cranial nerve deficit. Sensory: No sensory deficit. Motor: No weakness.       Comments: Strength 5/5 BUEs, sensation intact all extremities   Psychiatric:         Mood and Affect: Mood normal.         Behavior: Behavior normal.

## 2020-05-05 ENCOUNTER — TELEMEDICINE (OUTPATIENT)
Dept: INTERNAL MEDICINE CLINIC | Age: 25
End: 2020-05-05
Payer: COMMERCIAL

## 2020-05-05 PROBLEM — Z20.2 POSSIBLE EXPOSURE TO STD: Status: RESOLVED | Noted: 2019-09-03 | Resolved: 2020-05-05

## 2020-05-05 PROBLEM — Z32.00 POSSIBLE PREGNANCY, NOT YET CONFIRMED: Status: RESOLVED | Noted: 2019-09-03 | Resolved: 2020-05-05

## 2020-05-05 PROCEDURE — G8417 CALC BMI ABV UP PARAM F/U: HCPCS | Performed by: FAMILY MEDICINE

## 2020-05-05 PROCEDURE — 4004F PT TOBACCO SCREEN RCVD TLK: CPT | Performed by: FAMILY MEDICINE

## 2020-05-05 PROCEDURE — 99214 OFFICE O/P EST MOD 30 MIN: CPT | Performed by: FAMILY MEDICINE

## 2020-05-05 PROCEDURE — G8427 DOCREV CUR MEDS BY ELIG CLIN: HCPCS | Performed by: FAMILY MEDICINE

## 2020-11-09 ENCOUNTER — TELEPHONE (OUTPATIENT)
Dept: INTERNAL MEDICINE CLINIC | Age: 25
End: 2020-11-09

## 2020-11-09 ENCOUNTER — NURSE TRIAGE (OUTPATIENT)
Dept: OTHER | Facility: CLINIC | Age: 25
End: 2020-11-09

## 2020-11-09 NOTE — TELEPHONE ENCOUNTER
Reason for Disposition   Earache    Answer Assessment - Initial Assessment Questions  1. LOCATION: \"Where does it hurt? \"       Ear and was in her sinuses R>L    2. ONSET: \"When did the sinus pain start? \"  (e.g., hours, days)       3 months ago    3. SEVERITY: \"How bad is the pain? \"   (Scale 1-10; mild, moderate or severe)    - MILD (1-3): doesn't interfere with normal activities     - MODERATE (4-7): interferes with normal activities (e.g., work or school) or awakens from sleep    - SEVERE (8-10): excruciating pain and patient unable to do any normal activities         No pain just fullness     4. RECURRENT SYMPTOM: \"Have you ever had sinus problems before? \" If so, ask: \"When was the last time? \" and \"What happened that time? \"       Has had clogged ear before but not for this long     5. NASAL CONGESTION: \"Is the nose blocked? \" If so, ask, \"Can you open it or must you breathe through the mouth? \"      Nasal congestion has gotten better from her sinus infection    6. NASAL DISCHARGE: \"Do you have discharge from your nose? \" If so ask, \"What color? \"      None    7. FEVER: \"Do you have a fever? \" If so, ask: \"What is it, how was it measured, and when did it start? \"       None    8. OTHER SYMPTOMS: \"Do you have any other symptoms? \" (e.g., sore throat, cough, earache, difficulty breathing)      Clogged ears     9. PREGNANCY: \"Is there any chance you are pregnant? \" \"When was your last menstrual period? \"      none    Protocols used: SINUS PAIN OR CONGESTION-ADULT-OH    Caller provided care advice and instructed to call back with worsening symptoms. Pt given care path for ear congestion. Pt to be seen in the office today. Pt transferred to [facility] Provider: Thank you for allowing me to participate in the care of your patient. The patient was connected to triage in response to information provided to the [facility]. Please do not respond through this encounter as the response is not directed to a shared pool.

## 2020-12-16 ENCOUNTER — HOSPITAL ENCOUNTER (EMERGENCY)
Age: 25
Discharge: HOME | End: 2020-12-16
Payer: MEDICARE

## 2020-12-16 VITALS
OXYGEN SATURATION: 99 % | WEIGHT: 205 LBS | SYSTOLIC BLOOD PRESSURE: 152 MMHG | BODY MASS INDEX: 32.95 KG/M2 | RESPIRATION RATE: 20 BRPM | TEMPERATURE: 98.6 F | HEART RATE: 103 BPM | DIASTOLIC BLOOD PRESSURE: 91 MMHG | HEIGHT: 66 IN

## 2020-12-16 PROCEDURE — 99283 EMERGENCY DEPT VISIT LOW MDM: CPT

## 2020-12-16 RX ORDER — CYCLOBENZAPRINE HCL 10 MG
10 TABLET ORAL 3 TIMES DAILY PRN
Qty: 15 TABLET | Refills: 0 | Status: SHIPPED | OUTPATIENT
Start: 2020-12-16 | End: 2020-12-26

## 2020-12-16 ASSESSMENT — PAIN SCALES - GENERAL: PAINLEVEL_OUTOF10: 10

## 2020-12-16 ASSESSMENT — ENCOUNTER SYMPTOMS
COUGH: 0
SORE THROAT: 0
SHORTNESS OF BREATH: 0
BACK PAIN: 0
EYE PAIN: 0
NAUSEA: 0
VOMITING: 0
ABDOMINAL PAIN: 0
COLOR CHANGE: 0

## 2020-12-17 NOTE — ED PROVIDER NOTES
[facility] ED  eMERGENCY dEPARTMENT eNCOUnter      Pt Name: Radha Springer  MRN: [id number]  [place] 1995  Date of evaluation: 12/16/2020  Provider: CARLEEN Garcia [id number]       Chief Complaint   Patient presents with    Neck Pain     radiates to left eye          HISTORY OF PRESENT ILLNESS  (Location/Symptom, Timing/Onset, Context/Setting, Quality, Duration, Modifying Factors, Severity.)   Radha Springer is a 26 y.o. female who presents to the emergency department via private auto for left neck pain that radiates to her head. Onset was one week ago. Denies injury, fever, chills, N/T, weakness. States she had the same pain in May, 2020; it was relieved with muscle relaxers. She is currently pregnant. She has been taking tylenol without relief. Rates her pain 10/10 at this time. The pain increases with rotation of her neck to the left. Nursing Notes were reviewed. ALLERGIES     Patient has no known allergies. CURRENT MEDICATIONS       Discharge Medication List as of 12/16/2020  9:42 PM          PAST MEDICAL HISTORY   History reviewed. No pertinent past medical history. SURGICAL HISTORY           Procedure Laterality Date    TONSILLECTOMY      WISDOM TOOTH EXTRACTION           FAMILY HISTORY     History reviewed. No pertinent family history. No family status information on file. SOCIAL HISTORY      reports that she has been smoking cigarettes. She has been smoking about 0.50 packs per day. She has never used smokeless tobacco. She reports that she does not drink alcohol or use drugs. REVIEW OF SYSTEMS    (2-9 systems for level 4, 10 or more for level 5)     Review of Systems   Constitutional: Negative for chills, diaphoresis, fatigue and fever. HENT: Negative for congestion and sore throat. Eyes: Negative for pain and visual disturbance. Respiratory: Negative for cough and shortness of breath.     Cardiovascular: No cranial nerve deficit. Motor: Motor function is intact. No weakness. Coordination: Coordination is intact. Gait: Gait is intact. Gait normal.   Psychiatric:         Behavior: Behavior normal.         EMERGENCY DEPARTMENT COURSE and DIFFERENTIAL DIAGNOSIS/MDM:   Vitals:    Vitals:    12/16/20 2054 12/16/20 2056   BP: (!) 152/91    Pulse: 103    Resp: 20    Temp: 98.6 °F (37 °C)    TempSrc: Oral    SpO2: 99%    Weight:  205 lb (93 kg)   Height:  5' 6\" (1.676 m)       CLINICAL DECISION MAKING:  The patient presented alert with a nontoxic appearance and was seen in conjunction with Dr. Edyth Severe. A prescription was written for flexeril at the recommendation of the physician. The patient was advised to not drink alcohol, drive, or operate heavy machinery while taking the flexeril. She was advised to continue tylenol as directed. Follow up with OB/GYN. She is aware of the risks of taking flexeril in pregnancy. FINAL IMPRESSION      1.  Torticollis            Problem List  Patient Active Problem List   Diagnosis Code    Anxiety with somatization F41.9, F45.0    Anxiety attack F41.0         DISPOSITION/PLAN   DISPOSITION Decision To Discharge 12/16/2020 09:37:34 PM      PATIENT REFERRED TO:   Deisy Thomson MD  [address] [address] [address]  [phone]    Schedule an appointment as soon as possible for a visit         DISCHARGE MEDICATIONS:     Discharge Medication List as of 12/16/2020  9:42 PM      START taking these medications    Details   cyclobenzaprine (FLEXERIL) 10 MG tablet Take 1 tablet by mouth 3 times daily as needed for Muscle spasms WARNING:  May cause drowsiness.  May impair ability to operate vehicles or machinery.  Do not use in combination with alcohol., Disp-15 tablet, R-0Print                 (Please note that portions of this note were completed with a voice recognition program.  Efforts were made to edit the dictations but occasionally words are mis-transcribed.)    Sarah Tan, CARLEEN - CARLEEN Acevedo - CNP  12/16/20 [id number]

## 2021-01-09 ENCOUNTER — HOSPITAL ENCOUNTER (EMERGENCY)
Age: 26
Discharge: HOME | End: 2021-01-09
Attending: EMERGENCY MEDICINE
Payer: MEDICARE

## 2021-01-09 VITALS
TEMPERATURE: 99.3 F | OXYGEN SATURATION: 100 % | SYSTOLIC BLOOD PRESSURE: 122 MMHG | DIASTOLIC BLOOD PRESSURE: 65 MMHG | WEIGHT: 197.8 LBS | BODY MASS INDEX: 31.79 KG/M2 | HEART RATE: 103 BPM | HEIGHT: 66 IN | RESPIRATION RATE: 18 BRPM

## 2021-01-09 DIAGNOSIS — N39.0 URINARY TRACT INFECTION WITHOUT HEMATURIA, SITE UNSPECIFIED: Primary | ICD-10-CM

## 2021-01-09 DIAGNOSIS — R00.0 TACHYCARDIA: ICD-10-CM

## 2021-01-09 LAB
-: ABNORMAL
ABSOLUTE EOS #: 0.31 K/UL (ref 0–0.44)
ABSOLUTE IMMATURE GRANULOCYTE: 0.09 K/UL (ref 0–0.3)
ABSOLUTE LYMPH #: 2.93 K/UL (ref 1.1–3.7)
ABSOLUTE MONO #: 0.78 K/UL (ref 0.1–1.2)
ALBUMIN SERPL-MCNC: 3.9 G/DL (ref 3.5–5.2)
ALBUMIN/GLOBULIN RATIO: ABNORMAL (ref 1–2.5)
ALP BLD-CCNC: 82 U/L (ref 35–104)
ALT SERPL-CCNC: 9 U/L (ref 5–33)
AMORPHOUS: ABNORMAL
ANION GAP SERPL CALCULATED.3IONS-SCNC: 14 MMOL/L (ref 9–17)
AST SERPL-CCNC: 13 U/L
BACTERIA: ABNORMAL
BASOPHILS # BLD: 0 % (ref 0–2)
BASOPHILS ABSOLUTE: 0.05 K/UL (ref 0–0.2)
BILIRUB SERPL-MCNC: 0.15 MG/DL (ref 0.3–1.2)
BILIRUBIN URINE: NEGATIVE
BUN BLDV-MCNC: 6 MG/DL (ref 6–20)
BUN/CREAT BLD: 12 (ref 9–20)
CALCIUM SERPL-MCNC: 9.8 MG/DL (ref 8.6–10.4)
CASTS UA: ABNORMAL /LPF
CHLORIDE BLD-SCNC: 101 MMOL/L (ref 98–107)
CHP ED QC CHECK: NORMAL
CO2: 20 MMOL/L (ref 20–31)
COLOR: YELLOW
COMMENT UA: ABNORMAL
CREAT SERPL-MCNC: 0.51 MG/DL (ref 0.5–0.9)
CRYSTALS, UA: ABNORMAL /HPF
DIFFERENTIAL TYPE: ABNORMAL
DIRECT EXAM: NORMAL
EOSINOPHILS RELATIVE PERCENT: 2 % (ref 1–4)
EPITHELIAL CELLS UA: ABNORMAL /HPF (ref 0–5)
GFR AFRICAN AMERICAN: >60 ML/MIN
GFR NON-AFRICAN AMERICAN: >60 ML/MIN
GFR SERPL CREATININE-BSD FRML MDRD: ABNORMAL ML/MIN/{1.73_M2}
GFR SERPL CREATININE-BSD FRML MDRD: ABNORMAL ML/MIN/{1.73_M2}
GLUCOSE BLD-MCNC: 149 MG/DL (ref 70–99)
GLUCOSE URINE: NEGATIVE
HCT VFR BLD CALC: 38 % (ref 36.3–47.1)
HEMOGLOBIN: 13 G/DL (ref 11.9–15.1)
IMMATURE GRANULOCYTES: 1 %
KETONES, URINE: ABNORMAL
LACTIC ACID, SEPSIS WHOLE BLOOD: NORMAL MMOL/L (ref 0.5–1.9)
LACTIC ACID, SEPSIS WHOLE BLOOD: NORMAL MMOL/L (ref 0.5–1.9)
LACTIC ACID, SEPSIS: 1.2 MMOL/L (ref 0.5–1.9)
LACTIC ACID, SEPSIS: 1.5 MMOL/L (ref 0.5–1.9)
LEUKOCYTE ESTERASE, URINE: ABNORMAL
LYMPHOCYTES # BLD: 16 % (ref 24–43)
Lab: NORMAL
MAGNESIUM: 1.9 MG/DL (ref 1.6–2.6)
MCH RBC QN AUTO: 29.6 PG (ref 25.2–33.5)
MCHC RBC AUTO-ENTMCNC: 34.2 G/DL (ref 28.4–34.8)
MCV RBC AUTO: 86.6 FL (ref 82.6–102.9)
MONOCYTES # BLD: 4 % (ref 3–12)
MUCUS: ABNORMAL
NITRITE, URINE: NEGATIVE
NRBC AUTOMATED: 0 PER 100 WBC
OTHER OBSERVATIONS UA: ABNORMAL
PDW BLD-RTO: 13.1 % (ref 11.8–14.4)
PH UA: 5.5 (ref 5–8)
PLATELET # BLD: 339 K/UL (ref 138–453)
PLATELET ESTIMATE: ABNORMAL
PMV BLD AUTO: 9.4 FL (ref 8.1–13.5)
POTASSIUM SERPL-SCNC: 3.4 MMOL/L (ref 3.7–5.3)
PREGNANCY TEST URINE, POC: NORMAL
PROTEIN UA: NEGATIVE
RBC # BLD: 4.39 M/UL (ref 3.95–5.11)
RBC # BLD: ABNORMAL 10*6/UL
RBC UA: ABNORMAL /HPF (ref 0–2)
RENAL EPITHELIAL, UA: ABNORMAL /HPF
SARS-COV-2, RAPID: NOT DETECTED
SARS-COV-2: NORMAL
SARS-COV-2: NORMAL
SEG NEUTROPHILS: 77 % (ref 36–65)
SEGMENTED NEUTROPHILS ABSOLUTE COUNT: 14.5 K/UL (ref 1.5–8.1)
SODIUM BLD-SCNC: 135 MMOL/L (ref 135–144)
SOURCE: NORMAL
SPECIFIC GRAVITY UA: 1.02 (ref 1–1.03)
SPECIMEN DESCRIPTION: NORMAL
THYROXINE, FREE: 1.12 NG/DL (ref 0.93–1.7)
TOTAL PROTEIN: 7.5 G/DL (ref 6.4–8.3)
TRICHOMONAS: ABNORMAL
TSH SERPL DL<=0.05 MIU/L-ACNC: 0.55 MIU/L (ref 0.3–5)
TURBIDITY: ABNORMAL
URINE HGB: NEGATIVE
UROBILINOGEN, URINE: NORMAL
WBC # BLD: 18.7 K/UL (ref 3.5–11.3)
WBC # BLD: ABNORMAL 10*3/UL
WBC UA: ABNORMAL /HPF (ref 0–5)
YEAST: ABNORMAL

## 2021-01-09 PROCEDURE — 83735 ASSAY OF MAGNESIUM: CPT

## 2021-01-09 PROCEDURE — 2580000003 HC RX 258: Performed by: EMERGENCY MEDICINE

## 2021-01-09 PROCEDURE — U0002 COVID-19 LAB TEST NON-CDC: HCPCS

## 2021-01-09 PROCEDURE — 84443 ASSAY THYROID STIM HORMONE: CPT

## 2021-01-09 PROCEDURE — 2580000003 HC RX 258: Performed by: PHYSICIAN ASSISTANT

## 2021-01-09 PROCEDURE — 93005 ELECTROCARDIOGRAM TRACING: CPT | Performed by: PHYSICIAN ASSISTANT

## 2021-01-09 PROCEDURE — 83605 ASSAY OF LACTIC ACID: CPT

## 2021-01-09 PROCEDURE — 6360000002 HC RX W HCPCS: Performed by: EMERGENCY MEDICINE

## 2021-01-09 PROCEDURE — 81001 URINALYSIS AUTO W/SCOPE: CPT

## 2021-01-09 PROCEDURE — 80053 COMPREHEN METABOLIC PANEL: CPT

## 2021-01-09 PROCEDURE — 96365 THER/PROPH/DIAG IV INF INIT: CPT

## 2021-01-09 PROCEDURE — 84439 ASSAY OF FREE THYROXINE: CPT

## 2021-01-09 PROCEDURE — 85025 COMPLETE CBC W/AUTO DIFF WBC: CPT

## 2021-01-09 PROCEDURE — 99283 EMERGENCY DEPT VISIT LOW MDM: CPT

## 2021-01-09 PROCEDURE — 6370000000 HC RX 637 (ALT 250 FOR IP): Performed by: PHYSICIAN ASSISTANT

## 2021-01-09 PROCEDURE — 87086 URINE CULTURE/COLONY COUNT: CPT

## 2021-01-09 PROCEDURE — 87804 INFLUENZA ASSAY W/OPTIC: CPT

## 2021-01-09 PROCEDURE — 81025 URINE PREGNANCY TEST: CPT

## 2021-01-09 RX ORDER — ACETAMINOPHEN 500 MG
1000 TABLET ORAL ONCE
Status: COMPLETED | OUTPATIENT
Start: 2021-01-09 | End: 2021-01-09

## 2021-01-09 RX ORDER — CEPHALEXIN 500 MG/1
500 CAPSULE ORAL 2 TIMES DAILY
Qty: 14 CAPSULE | Refills: 0 | Status: ON HOLD | OUTPATIENT
Start: 2021-01-09 | End: 2021-01-13 | Stop reason: SDUPTHER

## 2021-01-09 RX ORDER — 0.9 % SODIUM CHLORIDE 0.9 %
1000 INTRAVENOUS SOLUTION INTRAVENOUS ONCE
Status: COMPLETED | OUTPATIENT
Start: 2021-01-09 | End: 2021-01-09

## 2021-01-09 RX ADMIN — SODIUM CHLORIDE 1000 ML: 9 INJECTION, SOLUTION INTRAVENOUS at 18:37

## 2021-01-09 RX ADMIN — CEFTRIAXONE SODIUM 1 G: 1 INJECTION, POWDER, FOR SOLUTION INTRAMUSCULAR; INTRAVENOUS at 20:52

## 2021-01-09 RX ADMIN — ACETAMINOPHEN 1000 MG: 500 TABLET ORAL at 19:26

## 2021-01-09 NOTE — ED PROVIDER NOTES
[address] [place] [address] ED  [facility]      Pt Name: Erna Emery  MRN: [id number]  [place] 1995  Date ofevaluation: 1/9/2021  Provider: Yan Lopez Dr       Chief Complaint   Patient presents with    Tachycardia     14 weeks preegnant, on and off for 2 weeks         HISTORY OF PRESENT ILLNESS  (Location/Symptom, Timing/Onset, Context/Setting, Quality, Duration, Modifying Factors, Severity.)   Erna Emery is a 26 y.o. female who presents to the emergency department with tachycardia. Patient 40 weeks pregnant. Reports intermittent tachycardia off and on for the last 2 weeks. States that she has similar symptoms when she was 16years old but nothing ever came of it. Never has been any medication for. No chest pain shortness of breath, nausea, vomiting abdominal pain, back pain or vaginal bleeding. No definite alleviating aggravating factors. No other complaints. This is her third pregnancy. Has never had any pregnancy complications. Nursing Notes were reviewed. ALLERGIES     Patient has no known allergies. CURRENT MEDICATIONS       Discharge Medication List as of 1/9/2021  9:13 PM      CONTINUE these medications which have NOT CHANGED    Details   Prenatal Vit-Fe Fumarate-FA (PRENATAL ONE DAILY PO) Take by mouth dailyHistorical Med             PAST MEDICAL HISTORY   History reviewed. No pertinent past medical history. SURGICAL HISTORY           Procedure Laterality Date    TONSILLECTOMY      WISDOM TOOTH EXTRACTION           FAMILY HISTORY     History reviewed. No pertinent family history. No family status information on file. SOCIAL HISTORY      reports that she has been smoking cigarettes. She has been smoking about 0.50 packs per day. She has never used smokeless tobacco. She reports that she does not drink alcohol or use drugs.     REVIEW OFSYSTEMS    (2-9 systems for level 4, 10 or more for level 5) Review of Systems    Except as noted above the remainder of the review of systems was reviewed and negative. PHYSICAL EXAM    (up to 7 for level 4, 8 or more for level 5)     ED Triage Vitals [01/09/21 1802]   BP Temp Temp Source Pulse Resp SpO2 Height Weight   (!) 164/87 99.3 °F (37.4 °C) Oral 128 16 99 % 5' 6\" (1.676 m) 197 lb 12.8 oz (89.7 kg)      Physical Exam  Constitutional:       Appearance: She is well-developed. HENT:      Head: Normocephalic and atraumatic. Neck:      Musculoskeletal: Normal range of motion and neck supple. Cardiovascular:      Rate and Rhythm: Normal rate and regular rhythm. Pulmonary:      Effort: Pulmonary effort is normal.      Breath sounds: Normal breath sounds. Abdominal:      General: There is no distension. Palpations: Abdomen is soft. Tenderness: There is no abdominal tenderness. Musculoskeletal: Normal range of motion. Skin:     General: Skin is warm. Findings: No rash. Neurological:      Mental Status: She is alert and oriented to person, place, and time.    Psychiatric:         Behavior: Behavior normal.                 DIAGNOSTIC RESULTS     EKG: All EKG's are interpreted by the Emergency Department Physician who either signs or Co-signs this chart in the absence of a cardiologist.        RADIOLOGY:   Non-plain film images such as CT, Ultrasound and MRI are read by the radiologist. Plain radiographic images arevisualized and preliminarily interpreted by the emergency physician with the below findings:        Interpretation per the Radiologist below, if available at thetime of this note:          ED BEDSIDE ULTRASOUND:   Performed by ED Physician - none    LABS:  Labs Reviewed   CBC WITH AUTO DIFFERENTIAL - Abnormal; Notable for the following components:       Result Value    WBC 18.7 (*)     Seg Neutrophils 77 (*)     Lymphocytes 16 (*)     Immature Granulocytes 1 (*)     Segs Absolute 14.50 (*)     All other components within normal limits   COMPREHENSIVE METABOLIC PANEL - Abnormal; Notable for the following components:    Glucose 149 (*)     Potassium 3.4 (*)     Total Bilirubin 0.15 (*)     All other components within normal limits   URINE RT REFLEX TO CULTURE - Abnormal; Notable for the following components:    Turbidity UA SLIGHTLY CLOUDY (*)     Ketones, Urine 2+ (*)     Leukocyte Esterase, Urine TRACE (*)     All other components within normal limits   MICROSCOPIC URINALYSIS - Abnormal; Notable for the following components:    Bacteria, UA MANY (*)     All other components within normal limits   POCT URINE PREGNANCY - Normal   RAPID INFLUENZA A/B ANTIGENS   CULTURE, URINE   MAGNESIUM   TSH WITHOUT REFLEX   T4, FREE   LACTATE, SEPSIS   LACTATE, SEPSIS   COVID-19   URINALYSIS WITH MICROSCOPIC   POCT URINALYSIS DIPSTICK       All other labs were within normal range or not returned as of this dictation. EMERGENCY DEPARTMENT COURSE and DIFFERENTIAL DIAGNOSIS/MDM:   Vitals:    Vitals:    01/09/21 1802 01/09/21 1920   BP: (!) 164/87 122/65   Pulse: 128 103   Resp: 16 18   Temp: 99.3 °F (37.4 °C)    TempSrc: Oral    SpO2: 99% 100%   Weight: 197 lb 12.8 oz (89.7 kg)    Height: 5' 6\" (1.676 m)     transfer was discussed with the patient. Patient wishes to be discharged home. She was treated for UTI with Rocephin and Keflex. Heart rate did improve from the upper 120s down to 103. Patient will be discharged home with with instructions for return precautions. CONSULTS:  None    PROCEDURES:  Procedures        FINAL IMPRESSION      1. Urinary tract infection without hematuria, site unspecified    2.  Tachycardia          DISPOSITION/PLAN   DISPOSITION Decision To Discharge 01/09/2021 09:11:38 PM      PATIENTREFERRED TO:   Roly Harvey MD  [address] [place] [id number] [phone]    In 3 days        DISCHARGE MEDICATIONS:     Discharge Medication List as of 1/9/2021  9:13 PM      START taking these medications Details   cephALEXin (KEFLEX) 500 MG capsule Take 1 capsule by mouth 2 times daily for 7 days, Disp-14 capsule, R-0Print                 (Please note that portions of this note were completed with a voice recognition program.  Efforts were made to edit thedictations but occasionally words are mis-transcribed.)    WILLIAMS Don PA-C  01/10/21 1792

## 2021-01-10 LAB
CULTURE: NORMAL
Lab: NORMAL
SPECIMEN DESCRIPTION: NORMAL

## 2021-01-10 NOTE — ED PROVIDER NOTES
[address]     Pt Name: Erick Johansen  MRN: [id number]  [place] 1995  Date of evaluation: 21   Erick Johansen is a 26 y.o. female with CC: Tachycardia (14 weeks preegnant, on and off for 2 weeks)    MDM:   The patient is  10 weeks pregnant with confirmed IUP who presented to the emergency department secondary to tachycardia. Patient noted elevated heart rate while at home she states been ongoing for several years when she was 15 she was monitored with a Holter monitor by cardiology. Patient denies use of energy drinks thyroid disorder. Patient's heart rate initially 127 on arrival in the emergency department she received IV fluids labs obtained. WBC of 18 urinalysis bacteremia some leukocyte esterase initiated on antibiotics with ceftriaxone. Patient had no vaginal bleeding spotting or abdominal pain. Reevaluation heart rate decreased to 103 discussed with patient transfer to [facility] for antibiotics other ancillary testing and treatment however she declined. She stated that she wanted to try to be discharged home with antibiotics if she felt worse she will return to the emergency department. Given this patient given Keflex outpatient follow-up and strict parameters to return to the emergency department. CRITICAL CARE:       EKG: All EKG's are interpreted by the Emergency Department Physician who either signs or Co-signs this chart in the absence of a cardiologist.      RADIOLOGY:All plain film, CT, MRI, and formal ultrasound images (except ED bedside ultrasound) are read by the radiologist, see reports below, unless otherwise noted in MDM or here. No orders to display     LABS: All lab results were reviewed by myself, and all abnormals are listed below.   Labs Reviewed   CBC WITH AUTO DIFFERENTIAL - Abnormal; Notable for the following components:       Result Value    WBC 18.7 (*)     Seg Neutrophils 77 (*)     Lymphocytes 16 (*)     Immature Granulocytes 1 (*)     Segs Absolute 14.50 (*)     All other components within normal limits   COMPREHENSIVE METABOLIC PANEL - Abnormal; Notable for the following components:    Glucose 149 (*)     Potassium 3.4 (*)     Total Bilirubin 0.15 (*)     All other components within normal limits   URINE RT REFLEX TO CULTURE - Abnormal; Notable for the following components:    Turbidity UA SLIGHTLY CLOUDY (*)     Ketones, Urine 2+ (*)     Leukocyte Esterase, Urine TRACE (*)     All other components within normal limits   MICROSCOPIC URINALYSIS - Abnormal; Notable for the following components:    Bacteria, UA MANY (*)     All other components within normal limits   POCT URINE PREGNANCY - Normal   RAPID INFLUENZA A/B ANTIGENS   CULTURE, URINE   MAGNESIUM   TSH WITHOUT REFLEX   T4, FREE   LACTATE, SEPSIS   COVID-19   LACTATE, SEPSIS   URINALYSIS WITH MICROSCOPIC   POCT URINALYSIS DIPSTICK     CONSULTS:  None  FINAL IMPRESSION      1. Urinary tract infection without hematuria, site unspecified    2. Tachycardia            PASTMEDICAL HISTORY   History reviewed. No pertinent past medical history. SURGICAL HISTORY       Past Surgical History:   Procedure Laterality Date    TONSILLECTOMY      WISDOM TOOTH EXTRACTION       CURRENT MEDICATIONS       Previous Medications    PRENATAL VIT-FE FUMARATE-FA (PRENATAL ONE DAILY PO)    Take by mouth daily     ALLERGIES     has No Known Allergies. FAMILY HISTORY     has no family status information on file.       SOCIAL HISTORY       Social History     Tobacco Use    Smoking status: Current Every Day Smoker     Packs/day: 0.50     Types: Cigarettes    Smokeless tobacco: Never Used    Tobacco comment: \"1/2pk/cigs/day\"   Substance Use Topics    Alcohol use: No     Alcohol/week: 0.0 standard drinks    Drug use: No       I personally evaluated and examined the patient in conjunction with the APC and agree with the assessment, treatment plan, and disposition of the patient as recorded by the APC.    Alysha Ghosh MD  Attending Emergency Physician          Alysha Ghosh MD  15/73/42 1426

## 2021-01-11 LAB
EKG ATRIAL RATE: 132 BPM
EKG P AXIS: 62 DEGREES
EKG P-R INTERVAL: 138 MS
EKG Q-T INTERVAL: 320 MS
EKG QRS DURATION: 94 MS
EKG QTC CALCULATION (BAZETT): 474 MS
EKG R AXIS: -8 DEGREES
EKG T AXIS: 58 DEGREES
EKG VENTRICULAR RATE: 132 BPM
HCG, PREGNANCY URINE (POC): POSITIVE

## 2021-01-11 PROCEDURE — 93010 ELECTROCARDIOGRAM REPORT: CPT | Performed by: INTERNAL MEDICINE

## 2021-01-12 ENCOUNTER — APPOINTMENT (OUTPATIENT)
Dept: GENERAL RADIOLOGY | Age: 26
End: 2021-01-12
Payer: MEDICARE

## 2021-01-12 ENCOUNTER — HOSPITAL ENCOUNTER (OUTPATIENT)
Age: 26
Setting detail: OBSERVATION
Discharge: HOME | End: 2021-01-13
Attending: EMERGENCY MEDICINE | Admitting: EMERGENCY MEDICINE
Payer: MEDICARE

## 2021-01-12 DIAGNOSIS — R07.89 OTHER CHEST PAIN: Primary | ICD-10-CM

## 2021-01-12 PROBLEM — R07.9 CHEST PAIN: Status: ACTIVE | Noted: 2021-01-12

## 2021-01-12 LAB
ABSOLUTE EOS #: 0.43 K/UL (ref 0–0.44)
ABSOLUTE IMMATURE GRANULOCYTE: 0.08 K/UL (ref 0–0.3)
ABSOLUTE LYMPH #: 4 K/UL (ref 1.1–3.7)
ABSOLUTE MONO #: 0.89 K/UL (ref 0.1–1.2)
ALBUMIN SERPL-MCNC: 3.9 G/DL (ref 3.5–5.2)
ALBUMIN/GLOBULIN RATIO: 1.1 (ref 1–2.5)
ALP BLD-CCNC: 87 U/L (ref 35–104)
ALT SERPL-CCNC: 11 U/L (ref 5–33)
ANION GAP SERPL CALCULATED.3IONS-SCNC: 13 MMOL/L (ref 9–17)
AST SERPL-CCNC: 14 U/L
BASOPHILS # BLD: 0 % (ref 0–2)
BASOPHILS ABSOLUTE: 0.05 K/UL (ref 0–0.2)
BILIRUB SERPL-MCNC: <0.1 MG/DL (ref 0.3–1.2)
BILIRUBIN URINE: NEGATIVE
BNP INTERPRETATION: NORMAL
BUN BLDV-MCNC: 4 MG/DL (ref 6–20)
BUN/CREAT BLD: ABNORMAL (ref 9–20)
CALCIUM SERPL-MCNC: 9.3 MG/DL (ref 8.6–10.4)
CHLORIDE BLD-SCNC: 105 MMOL/L (ref 98–107)
CO2: 19 MMOL/L (ref 20–31)
COLOR: YELLOW
COMMENT UA: NORMAL
CREAT SERPL-MCNC: 0.44 MG/DL (ref 0.5–0.9)
CREATININE URINE: 118 MG/DL (ref 28–217)
DIFFERENTIAL TYPE: ABNORMAL
EOSINOPHILS RELATIVE PERCENT: 2 % (ref 1–4)
GFR AFRICAN AMERICAN: >60 ML/MIN
GFR NON-AFRICAN AMERICAN: >60 ML/MIN
GFR SERPL CREATININE-BSD FRML MDRD: ABNORMAL ML/MIN/{1.73_M2}
GFR SERPL CREATININE-BSD FRML MDRD: ABNORMAL ML/MIN/{1.73_M2}
GLUCOSE BLD-MCNC: 95 MG/DL (ref 70–99)
GLUCOSE URINE: NEGATIVE
HCT VFR BLD CALC: 39.1 % (ref 36.3–47.1)
HEMOGLOBIN: 13.2 G/DL (ref 11.9–15.1)
IMMATURE GRANULOCYTES: 0 %
KETONES, URINE: NEGATIVE
LACTIC ACID, WHOLE BLOOD: 1.2 MMOL/L (ref 0.7–2.1)
LEUKOCYTE ESTERASE, URINE: NEGATIVE
LYMPHOCYTES # BLD: 20 % (ref 24–43)
MCH RBC QN AUTO: 29.4 PG (ref 25.2–33.5)
MCHC RBC AUTO-ENTMCNC: 33.8 G/DL (ref 28.4–34.8)
MCV RBC AUTO: 87.1 FL (ref 82.6–102.9)
MONOCYTES # BLD: 5 % (ref 3–12)
NITRITE, URINE: NEGATIVE
NRBC AUTOMATED: 0 PER 100 WBC
PDW BLD-RTO: 13.2 % (ref 11.8–14.4)
PH UA: 7 (ref 5–8)
PLATELET # BLD: 317 K/UL (ref 138–453)
PLATELET ESTIMATE: ABNORMAL
PMV BLD AUTO: 11 FL (ref 8.1–13.5)
POTASSIUM SERPL-SCNC: 3.6 MMOL/L (ref 3.7–5.3)
PRO-BNP: <20 PG/ML
PROTEIN UA: NEGATIVE
RBC # BLD: 4.49 M/UL (ref 3.95–5.11)
RBC # BLD: ABNORMAL 10*6/UL
SEG NEUTROPHILS: 73 % (ref 36–65)
SEGMENTED NEUTROPHILS ABSOLUTE COUNT: 14.52 K/UL (ref 1.5–8.1)
SODIUM BLD-SCNC: 137 MMOL/L (ref 135–144)
SPECIFIC GRAVITY UA: 1.02 (ref 1–1.03)
TOTAL PROTEIN, URINE: 15 MG/DL
TOTAL PROTEIN: 7.5 G/DL (ref 6.4–8.3)
TROPONIN INTERP: NORMAL
TROPONIN T: NORMAL NG/ML
TROPONIN, HIGH SENSITIVITY: <6 NG/L (ref 0–14)
TSH SERPL DL<=0.05 MIU/L-ACNC: 0.81 MIU/L (ref 0.3–5)
TURBIDITY: CLEAR
URINE HGB: NEGATIVE
URINE TOTAL PROTEIN CREATININE RATIO: 0.13 (ref 0–0.2)
UROBILINOGEN, URINE: NORMAL
WBC # BLD: 20 K/UL (ref 3.5–11.3)
WBC # BLD: ABNORMAL 10*3/UL

## 2021-01-12 PROCEDURE — 83880 ASSAY OF NATRIURETIC PEPTIDE: CPT

## 2021-01-12 PROCEDURE — 84443 ASSAY THYROID STIM HORMONE: CPT

## 2021-01-12 PROCEDURE — 85025 COMPLETE CBC W/AUTO DIFF WBC: CPT

## 2021-01-12 PROCEDURE — 71045 X-RAY EXAM CHEST 1 VIEW: CPT

## 2021-01-12 PROCEDURE — 82570 ASSAY OF URINE CREATININE: CPT

## 2021-01-12 PROCEDURE — G0378 HOSPITAL OBSERVATION PER HR: HCPCS

## 2021-01-12 PROCEDURE — 99285 EMERGENCY DEPT VISIT HI MDM: CPT

## 2021-01-12 PROCEDURE — 84156 ASSAY OF PROTEIN URINE: CPT

## 2021-01-12 PROCEDURE — 87040 BLOOD CULTURE FOR BACTERIA: CPT

## 2021-01-12 PROCEDURE — 93005 ELECTROCARDIOGRAM TRACING: CPT | Performed by: EMERGENCY MEDICINE

## 2021-01-12 PROCEDURE — 83605 ASSAY OF LACTIC ACID: CPT

## 2021-01-12 PROCEDURE — 80053 COMPREHEN METABOLIC PANEL: CPT

## 2021-01-12 PROCEDURE — 84484 ASSAY OF TROPONIN QUANT: CPT

## 2021-01-12 PROCEDURE — 81003 URINALYSIS AUTO W/O SCOPE: CPT

## 2021-01-12 PROCEDURE — 93005 ELECTROCARDIOGRAM TRACING: CPT | Performed by: STUDENT IN AN ORGANIZED HEALTH CARE EDUCATION/TRAINING PROGRAM

## 2021-01-12 RX ORDER — ACETAMINOPHEN 325 MG/1
650 TABLET ORAL EVERY 4 HOURS PRN
Status: DISCONTINUED | OUTPATIENT
Start: 2021-01-12 | End: 2021-01-13 | Stop reason: HOSPADM

## 2021-01-12 RX ORDER — SODIUM CHLORIDE 0.9 % (FLUSH) 0.9 %
10 SYRINGE (ML) INJECTION PRN
Status: DISCONTINUED | OUTPATIENT
Start: 2021-01-12 | End: 2021-01-13 | Stop reason: HOSPADM

## 2021-01-12 RX ORDER — ONDANSETRON 4 MG/1
4 TABLET, ORALLY DISINTEGRATING ORAL EVERY 8 HOURS PRN
Status: DISCONTINUED | OUTPATIENT
Start: 2021-01-12 | End: 2021-01-13 | Stop reason: HOSPADM

## 2021-01-12 RX ORDER — SODIUM CHLORIDE 0.9 % (FLUSH) 0.9 %
10 SYRINGE (ML) INJECTION EVERY 12 HOURS SCHEDULED
Status: DISCONTINUED | OUTPATIENT
Start: 2021-01-12 | End: 2021-01-13 | Stop reason: HOSPADM

## 2021-01-12 ASSESSMENT — ENCOUNTER SYMPTOMS
DIARRHEA: 0
WHEEZING: 0
CONSTIPATION: 0
PHOTOPHOBIA: 0
COUGH: 0
ABDOMINAL PAIN: 0
VOMITING: 0
NAUSEA: 0
SHORTNESS OF BREATH: 1

## 2021-01-12 ASSESSMENT — PAIN DESCRIPTION - LOCATION: LOCATION: CHEST

## 2021-01-12 ASSESSMENT — PAIN DESCRIPTION - ORIENTATION: ORIENTATION: LEFT;MID

## 2021-01-12 ASSESSMENT — PAIN DESCRIPTION - DESCRIPTORS: DESCRIPTORS: SQUEEZING;DISCOMFORT

## 2021-01-13 ENCOUNTER — APPOINTMENT (OUTPATIENT)
Dept: ULTRASOUND IMAGING | Age: 26
End: 2021-01-13
Payer: MEDICARE

## 2021-01-13 VITALS
SYSTOLIC BLOOD PRESSURE: 123 MMHG | RESPIRATION RATE: 16 BRPM | HEART RATE: 86 BPM | BODY MASS INDEX: 31.47 KG/M2 | OXYGEN SATURATION: 97 % | DIASTOLIC BLOOD PRESSURE: 76 MMHG | WEIGHT: 195 LBS | TEMPERATURE: 98.8 F

## 2021-01-13 LAB
-: ABNORMAL
AMORPHOUS: ABNORMAL
BACTERIA: ABNORMAL
BILIRUBIN URINE: NEGATIVE
CASTS UA: ABNORMAL /LPF (ref 0–8)
COLOR: YELLOW
CRYSTALS, UA: ABNORMAL /HPF
EKG ATRIAL RATE: 116 BPM
EKG ATRIAL RATE: 95 BPM
EKG P AXIS: 60 DEGREES
EKG P AXIS: 67 DEGREES
EKG P-R INTERVAL: 134 MS
EKG P-R INTERVAL: 138 MS
EKG Q-T INTERVAL: 326 MS
EKG Q-T INTERVAL: 372 MS
EKG QRS DURATION: 100 MS
EKG QRS DURATION: 96 MS
EKG QTC CALCULATION (BAZETT): 453 MS
EKG QTC CALCULATION (BAZETT): 467 MS
EKG R AXIS: -7 DEGREES
EKG R AXIS: -7 DEGREES
EKG T AXIS: 35 DEGREES
EKG T AXIS: 40 DEGREES
EKG VENTRICULAR RATE: 116 BPM
EKG VENTRICULAR RATE: 95 BPM
EPITHELIAL CELLS UA: ABNORMAL /HPF (ref 0–5)
GLUCOSE URINE: NEGATIVE
HCT VFR BLD CALC: 33.2 % (ref 36.3–47.1)
HEMOGLOBIN: 11.2 G/DL (ref 11.9–15.1)
KETONES, URINE: ABNORMAL
LEUKOCYTE ESTERASE, URINE: NEGATIVE
LV EF: 55 %
LVEF MODALITY: NORMAL
MCH RBC QN AUTO: 29.4 PG (ref 25.2–33.5)
MCHC RBC AUTO-ENTMCNC: 33.7 G/DL (ref 28.4–34.8)
MCV RBC AUTO: 87.1 FL (ref 82.6–102.9)
MUCUS: ABNORMAL
NITRITE, URINE: NEGATIVE
NRBC AUTOMATED: 0 PER 100 WBC
OTHER OBSERVATIONS UA: ABNORMAL
PDW BLD-RTO: 13.5 % (ref 11.8–14.4)
PH UA: 7 (ref 5–8)
PLATELET # BLD: 299 K/UL (ref 138–453)
PMV BLD AUTO: 9.7 FL (ref 8.1–13.5)
PROTEIN UA: NEGATIVE
RBC # BLD: 3.81 M/UL (ref 3.95–5.11)
RBC UA: ABNORMAL /HPF (ref 0–4)
RENAL EPITHELIAL, UA: ABNORMAL /HPF
SPECIFIC GRAVITY UA: 1.02 (ref 1–1.03)
TRICHOMONAS: ABNORMAL
TROPONIN INTERP: NORMAL
TROPONIN INTERP: NORMAL
TROPONIN T: NORMAL NG/ML
TROPONIN T: NORMAL NG/ML
TROPONIN, HIGH SENSITIVITY: <6 NG/L (ref 0–14)
TROPONIN, HIGH SENSITIVITY: <6 NG/L (ref 0–14)
TURBIDITY: ABNORMAL
URINE HGB: NEGATIVE
UROBILINOGEN, URINE: NORMAL
WBC # BLD: 14.6 K/UL (ref 3.5–11.3)
WBC UA: ABNORMAL /HPF (ref 0–5)
YEAST: ABNORMAL

## 2021-01-13 PROCEDURE — 93306 TTE W/DOPPLER COMPLETE: CPT

## 2021-01-13 PROCEDURE — G0378 HOSPITAL OBSERVATION PER HR: HCPCS

## 2021-01-13 PROCEDURE — 93010 ELECTROCARDIOGRAM REPORT: CPT | Performed by: INTERNAL MEDICINE

## 2021-01-13 PROCEDURE — 81001 URINALYSIS AUTO W/SCOPE: CPT

## 2021-01-13 PROCEDURE — 93225 XTRNL ECG REC<48 HRS REC: CPT

## 2021-01-13 PROCEDURE — 36415 COLL VENOUS BLD VENIPUNCTURE: CPT

## 2021-01-13 PROCEDURE — 85027 COMPLETE CBC AUTOMATED: CPT

## 2021-01-13 PROCEDURE — 84484 ASSAY OF TROPONIN QUANT: CPT

## 2021-01-13 PROCEDURE — 96360 HYDRATION IV INFUSION INIT: CPT

## 2021-01-13 PROCEDURE — 96361 HYDRATE IV INFUSION ADD-ON: CPT

## 2021-01-13 PROCEDURE — 2580000003 HC RX 258: Performed by: STUDENT IN AN ORGANIZED HEALTH CARE EDUCATION/TRAINING PROGRAM

## 2021-01-13 PROCEDURE — 93226 XTRNL ECG REC<48 HR SCAN A/R: CPT

## 2021-01-13 PROCEDURE — 76770 US EXAM ABDO BACK WALL COMP: CPT

## 2021-01-13 PROCEDURE — 93005 ELECTROCARDIOGRAM TRACING: CPT | Performed by: EMERGENCY MEDICINE

## 2021-01-13 RX ORDER — PSEUDOEPHEDRINE HYDROCHLORIDE 30 MG/1
30 TABLET ORAL EVERY 6 HOURS PRN
Qty: 28 TABLET | Refills: 0 | Status: SHIPPED | OUTPATIENT
Start: 2021-01-13 | End: 2022-01-04 | Stop reason: ALTCHOICE

## 2021-01-13 RX ORDER — SODIUM CHLORIDE 9 MG/ML
INJECTION, SOLUTION INTRAVENOUS CONTINUOUS
Status: DISCONTINUED | OUTPATIENT
Start: 2021-01-13 | End: 2021-01-13 | Stop reason: HOSPADM

## 2021-01-13 RX ORDER — CEPHALEXIN 500 MG/1
500 CAPSULE ORAL 2 TIMES DAILY
Qty: 14 CAPSULE | Refills: 0 | Status: SHIPPED | OUTPATIENT
Start: 2021-01-13 | End: 2021-01-20

## 2021-01-13 RX ADMIN — SODIUM CHLORIDE: 9 INJECTION, SOLUTION INTRAVENOUS at 01:36

## 2021-01-13 RX ADMIN — Medication 10 ML: at 00:39

## 2021-01-13 ASSESSMENT — ENCOUNTER SYMPTOMS
NAUSEA: 0
ABDOMINAL PAIN: 0
COLOR CHANGE: 0
CHEST TIGHTNESS: 0
BACK PAIN: 0
CONSTIPATION: 0
FACIAL SWELLING: 0
SHORTNESS OF BREATH: 1
VOMITING: 0
ABDOMINAL DISTENTION: 0
DIARRHEA: 0
APNEA: 0

## 2021-01-13 NOTE — ED NOTES
Report called to [facility] RN with opp for questions      Yanick Snyder RN  01/12/21 7761

## 2021-01-13 NOTE — ED TRIAGE NOTES
Pt to ED with c/o mid sternal chest pain radiating to her left arm and around to her back. Pt reports this began approx 4 hrs ago and states this recently happened and she was seen at another facility and told to follow up with a cardiologist, she has an appt Thursday to do so. Pt also reports feeling as if her heart was racing. Pt states she became slightly SOB with onset of symptoms, denies cough. Pt denies becoming diaphoretic. Pt denies N/V. Pt is 14 weeks pregnant with a uncomplicated pregnancy thus far. Pt on monitors, call light in reach.  NAD

## 2021-01-13 NOTE — FLOWSHEET NOTE
Raoul Bronwyn was hooked up to a 48-hour Holter (203) on 1/13/21 with written as well as verbal instructions given,

## 2021-01-13 NOTE — PROGRESS NOTES
[facility]  CDU / OBSERVATION eNCOUnter  Attending NOte       I performed a history and physical examination of the patient and discussed management with the resident. I reviewed the residents note and agree with the documented findings and plan of care. Any areas of disagreement are noted on the chart. I was personally present for the key portions of any procedures. I have documented in the chart those procedures where I was not present during the key portions. I have reviewed the nurses notes. I agree with the chief complaint, past medical history, past surgical history, allergies, medications, social and family history as documented unless otherwise noted below. The Family history, social history, and ROS are effectively unchanged since admission unless noted elsewhere in the chart. Patient with chest discomfort. Patient 15 weeks pregnant. Will recheck urinalysis. Patient admitted for evaluation by cardiology for possible cardiomyopathy. Patient declined work-up for PE. Patient was offered CT scan and declined but we will do echocardiogram.  Patient with normal vital signs. We will recheck urinalysis. We will get renal ultrasound and fetal ultrasound. Will recheck CBC.     Appreciate cardiology input    Walter Quesada MD  Attending Emergency  Physician

## 2021-01-13 NOTE — H&P
[facility]  CDU / OBSERVATION ENCOUNTER  RESIDENT NOTE     Pt Name: Roberth Patel  MRN: [id number]  [place] 1995  Date of evaluation: 21  Patient's PCP is :  Gladis Rodrigez MD    [address] [place] [address]       Chief Complaint   Patient presents with    Chest Pain         HISTORY OF PRESENT ILLNESS    Roberth Patel is a 26 y.o. female who presents with complaints of chest pain and palpitations. She reports this been going off and on for about 2 weeks. It was described in the ED as sharp, 7 out of 10 pain radiating to left shoulder with associated dyspnea on exertion. it its improved this morning. Patient says it she has had chest pains like this previously when she was younger, like her mother told her that she had arrhythmia at one time. Patient has recent history of UTI. Patient reports history of smoking half pack a day and currently smoking while pregnant. Patient 14 weeks pregnant . Denies any abdominal pain, dizziness, vision changes, headache, change the urination or bowel habits. Reports to be having ear pain for 6 months, concern for ear infection with hearing loss. REVIEW OF SYSTEMS       Review of Systems   Constitutional: Negative for chills, fatigue and fever. HENT: Positive for ear pain and hearing loss. Negative for facial swelling and nosebleeds. Respiratory: Positive for shortness of breath. Negative for apnea and chest tightness. Cardiovascular: Positive for chest pain and palpitations. Negative for leg swelling. Gastrointestinal: Negative for abdominal distention, abdominal pain, constipation, diarrhea, nausea and vomiting. Genitourinary: Negative for difficulty urinating and dysuria. Musculoskeletal: Negative for back pain. Skin: Negative for color change. Neurological: Negative for dizziness, weakness, light-headedness, numbness and headaches. Psychiatric/Behavioral: Negative for confusion.          (PQRS) Advance directives on face sheet per hospital policy. No change unless specifically mentioned in chart    PAST MEDICAL HISTORY    has no past medical history on file. 2 pregnancies without difficulty. I have reviewed the past medical history with the patient and it is not pertinent to this complaint. SURGICAL HISTORY      has a past surgical history that includes Tonsillectomy and [place] tooth extraction. I have reviewed and agree with Surgical History entered and it is pertinent to this complaint. CURRENT MEDICATIONS         0.9 % sodium chloride infusion, Continuous      sodium chloride flush 0.9 % injection 10 mL, 2 times per day      sodium chloride flush 0.9 % injection 10 mL, PRN      acetaminophen (TYLENOL) tablet 650 mg, Q4H PRN      ondansetron (ZOFRAN-ODT) disintegrating tablet 4 mg, Q8H PRN        All medication charted and reviewed. ALLERGIES     has No Known Allergies. FAMILY HISTORY     has no family status information on file. family history is not on file. The patient denies any pertinent family history. I have reviewed and agree with the family history entered. I have reviewed the Family History and it is not significant to the case    SOCIAL HISTORY      reports that she has been smoking cigarettes. She has been smoking about 0.50 packs per day. She has never used smokeless tobacco. She reports that she does not drink alcohol or use drugs. I have reviewed and agree with all Social.  There are concerns for substance abuse/use. Patient counseled on smoking cessation. PHYSICAL EXAM     INITIAL VITALS:  weight is 195 lb (88.5 kg). Her oral temperature is 98.8 °F (37.1 °C). Her blood pressure is 123/76 and her pulse is 86. Her respiration is 16 and oxygen saturation is 97%. Physical Exam  Vitals signs and nursing note reviewed. Constitutional:       Appearance: Normal appearance. HENT:      Head: Normocephalic and atraumatic.       Mouth/Throat:      Mouth: Mucous membranes are moist.      Pharynx: Oropharynx is clear. Eyes:      Extraocular Movements: Extraocular movements intact. Conjunctiva/sclera: Conjunctivae normal.   Neck:      Musculoskeletal: Normal range of motion and neck supple. Cardiovascular:      Rate and Rhythm: Normal rate and regular rhythm. Pulses: Normal pulses. Heart sounds: Normal heart sounds. No murmur. Pulmonary:      Effort: Pulmonary effort is normal.      Breath sounds: Normal breath sounds. No stridor. No wheezing, rhonchi or rales. Abdominal:      General: Abdomen is flat. There is no distension. Palpations: Abdomen is soft. Tenderness: There is no abdominal tenderness. There is no guarding. Musculoskeletal: Normal range of motion. General: No swelling or tenderness. Right lower le+ Edema present. Left lower le+ Edema present. Skin:     General: Skin is warm and dry. Coloration: Skin is not pale. Findings: No erythema or rash. Neurological:      General: No focal deficit present. Mental Status: She is alert and oriented to person, place, and time. Mental status is at baseline.    Psychiatric:         Mood and Affect: Mood normal.         Behavior: Behavior normal.           DIFFERENTIAL DIAGNOSIS/MDM:     Acute coronary syndrome, congenital heart issues, arrhythmia, ear infection    DIAGNOSTIC RESULTS     EKG: All EKG's are interpreted by the Observation Physician who either signs or Co-signs this chart in the absence of a cardiologist.    EKG Interpretation    2021  Interpreted by observation physician    Rhythm: normal sinus   Rate: normal  Axis: left  Ectopy: none  Conduction: normal  ST Segments: no acute change  T Waves: normal  Q Waves: none and aVl    Clinical Impression: non-specific EKG    Betito Cox DO        RADIOLOGY:   I directly visualized the following  images and reviewed the radiologist interpretations:    Xr Chest history   Obesity   CAD   (SLE, CKDz, HIV, Cocaine abuse)   Troponin Levels   = Normal Limit (0 pts)   1-3 Times Normal Limit (1 pt)   > 3 Times Normal Limit (2 pts)  TOTAL:    Percent Risk for Major Adverse Cardiac Event (MACE)  0-3 pts indicates low risk for MACE   2.5% (DISCHARGE)   4-7 pts indicates moderate risk for MACE  20.3% (OBS)  8-10 pts indicates high risk for MACE  72.7% (EARLY INVASIVE TX)    CDU IMPRESSION / PLAN      Erick Johansen is a 26 y.o. female who presents with acute chest pain also complains of chronic ear infections in the right ear. · Cardiology consult: Appreciate recommendations  · Continue home medications and pain control  · Monitor vitals, labs, and imaging  · DISPO: pending consults and clinical improvement    CONSULTS:    IP CONSULT TO CARDIOLOGY    PROCEDURES:  Not indicated       PATIENT REFERRED TO:    No follow-up provider specified. --  [address] [facility], DO   Emergency Medicine Resident     This dictation was generated by voice recognition computer software. Although all attempts are made to edit the dictation for accuracy, there may be errors in the transcription that are not intended.

## 2021-01-13 NOTE — ED PROVIDER NOTES
[facility]     Emergency Department     Faculty Attestation        I performed a history and physical examination of the patient and discussed management with the resident. I reviewed the residents note and agree with the documented findings and plan of care. Any areas of disagreement are noted on the chart. I was personally present for the key portions of any procedures. I have documented in the chart those procedures where I was not present during the key portions. I have reviewed the emergency nurses triage note. I agree with the chief complaint, past medical history, past surgical history, allergies, medications, social and family history as documented unless otherwise noted below. For mid-level providers such as nurse practitioners as well as physicians assistants:    I have personally seen and evaluated the patient. I find the patient's history and physical exam are consistent with NP/PA documentation. I agree with the care provided, treatment rendered, disposition, & follow-up plan. Additional findings are as noted. Vital Signs: /85   Pulse 97   Temp 98.2 °F (36.8 °C) (Skin)   Resp 17   Wt 195 lb (88.5 kg)   LMP 10/13/2020 (Approximate)   SpO2 99%   BMI 31.47 kg/m²   PCP:  Roberto Carlos Medeiros MD    Pertinent Comments:     Patient is 14 weeks pregnant who presents to the emergency department evaluation of chest pain shortness of breath she was seen [facility] and had a extensive work-up including Covid swab influenza and full laboratory work-up work-up at that time was significant for an elevated white count possible UTI she was sent home on antibiotics after they requested that she be admitted. She presents with continued left-sided chest pain and intermittent shortness of breath. She denies vaginal bleeding vaginal discharge hematuria dysuria.   She states she does have a history since her cardiac condition has a cane that required Holter monitors but never followed up with a cardiologist.  She is afebrile nontoxic here work-up here shows her white count of 20,000. Lactate and blood cultures obtained she has no evidence of pneumonia or UTI she has no fevers, chills, headache sore throat or signs of symptoms of any infectious process due to continued tachycardia and symptomatology will admit and observe cardiology for evaluation of possible echo. Patient declined and refused CT imaging in the emergency department to rule out PE.       Critical Care  None          Theodore Hutchison MD  Attending Emergency Medicine Physician              Yoon Patel MD  01/12/21 2374

## 2021-01-13 NOTE — CONSULTS
Ok Black PA-C        Current Facility-Administered Medications: 0.9 % sodium chloride infusion, , Intravenous, Continuous  sodium chloride flush 0.9 % injection 10 mL, 10 mL, Intravenous, 2 times per day  sodium chloride flush 0.9 % injection 10 mL, 10 mL, Intravenous, PRN  acetaminophen (TYLENOL) tablet 650 mg, 650 mg, Oral, Q4H PRN  ondansetron (ZOFRAN-ODT) disintegrating tablet 4 mg, 4 mg, Oral, Q8H PRN    Allergies:  Patient has no known allergies. Social History:   reports that she has been smoking cigarettes. She has been smoking about 0.50 packs per day. She has never used smokeless tobacco. She reports that she does not drink alcohol or use drugs. Family History: family history is not on file. No h/o sudden cardiac death. No for premature CAD    REVIEW OF SYSTEMS:    · Constitutional: there has been no unanticipated weight loss. There's been No change in energy level, No change in activity level. · Eyes: No visual changes or diplopia. No scleral icterus. · ENT: No Headaches  · Cardiovascular: Remaining as above  · Respiratory: No previous pulmonary problems, No cough  · Gastrointestinal: No abdominal pain. No change in bowel or bladder habits. · Genitourinary: No dysuria, trouble voiding, or hematuria. · Musculoskeletal:  No gait disturbance, No weakness or joint complaints. · Integumentary: No rash or pruritis. · Neurological: No headache, diplopia, change in muscle strength, numbness or tingling. No change in gait, balance, coordination, mood, affect, memory, mentation, behavior. · Psychiatric: No anxiety, or depression. · Endocrine: No temperature intolerance. No excessive thirst, fluid intake, or urination. No tremor. · Hematologic/Lymphatic: No abnormal bruising or bleeding, blood clots or swollen lymph nodes. · Allergic/Immunologic: No nasal congestion or hives.       PHYSICAL EXAM:      BP (!) 133/90   Pulse 96   Temp 98.4 °F (36.9 °C) (Oral)   Resp 16   Wt 195 lb (88.5 kg) LMP 10/13/2020 (Approximate)   SpO2 97%   BMI 31.47 kg/m²    No intake or output data in the 24 hours ending 01/13/21 0825      Constitutional and General Appearance: alert, cooperative, no distress and appears stated age  [de-identified]: PERRL, no cervical lymphadenopathy. No masses palpable. Normal oral mucosa  Respiratory:  · Normal excursion and expansion without use of accessory muscles  · Resp Auscultation: Good respiratory effort. No for increased work of breathing. On auscultation: clear to auscultation bilaterally  Cardiovascular:  · The apical impulse is not displaced  · Heart tones are crisp and normal. regular S1 and S2.  · Jugular venous pulsation Normal  · The carotid upstroke is normal in amplitude and contour without delay or bruit  · Peripheral pulses are symmetrical and full     Abdomen:   · No masses or tenderness  · Bowel sounds present  Extremities:  ·  No Cyanosis or Clubbing  ·  Lower extremity edema: No  ·  Skin: Warm and dry  Neurological:  · Alert and oriented. · Moves all extremities well  · No abnormalities of mood, affect, memory, mentation, or behavior are noted    DATA:    Diagnostics:    EKG: Normal sinus rhythm  Possible Left atrial enlargement  Incomplete right bundle branch block  Borderline ECG  When compared with ECG of 12-JAN-2021 20:13,  No significant change was found  ECHO:  not obtained. Stress Test: Not obtained  Cardiac Angiography: not obtained. Labs:     CBC:   Recent Labs     01/12/21 2051   WBC 20.0*   HGB 13.2   HCT 39.1          BMP:   Recent Labs     01/12/21 2051      K 3.6*   CO2 19*   BUN 4*   CREATININE 0.44*   LABGLOM >60   GLUCOSE 95     Pro-BNP:    Recent Labs     01/12/21 2051   PROBNP <20     BNP: No results for input(s): BNP in the last 72 hours. PT/INR: No results for input(s): PROTIME, INR in the last 72 hours. APTT:No results for input(s): APTT in the last 72 hours.   CARDIAC ENZYMES:No results for input(s): CKTOTAL, CKMB, CKMBINDEX, TROPONINI in the last 72 hours. Invalid input(s):  Ruthann Suarez  Recent Labs     01/12/21 2051 01/13/21 0222 01/13/21  0408   TROPONINT NOT REPORTED NOT REPORTED NOT REPORTED      Recent Labs     01/12/21 2051 01/13/21 0222 01/13/21  0408   TROPHS <6 <6 <6     FASTING LIPID PANEL:No results found for: HDL, LDLDIRECT, LDLCALC, TRIG  LIVER PROFILE:  Recent Labs     01/12/21 2051   AST 14   ALT 11   LABALBU 3.9         Patient's Active Problem List  Active Problems:    Chest pain  Resolved Problems:    * No resolved hospital problems. *        IMPRESSION:    1. Chest pain. RECOMMENDATIONS:  1. Recommendations per Attending             Discussed with patient and Nurse. Emil Adams MS4  Medical Student, Cardiovascular Diseases    [facility]        Attestation signed by      Attending Physician Statement:    I have discussed the care of  Erick Johansen , including pertinent history and exam findings, with the Cardiology fellow/resident. I have seen and examined the patient and the key elements of all parts of the encounter have been performed by me. I agree with the assessment, plan and orders as documented by the fellow/resident, after I modified exam findings and plan of treatments, and the final version is my approved version of the assessment. Additional Comments: The patient was seen and examined, agree with above, presented with chest pain, more while sitting down, not exertional, atypical. Currently 14 wks pregnant. ECG and Alberto no acute changes. Also has palpitations. Obtain echo, Holter. Will follow.

## 2021-01-13 NOTE — ED PROVIDER NOTES
[facility] ED  Emergency Department Encounter  Emergency Medicine Resident     Pt Name: Andrew Montes  MRN: [id number]  [place] 1995  Date of evaluation: 21  PCP:  Nghia Golden MD    [address] [place] [address]       Chief Complaint   Patient presents with    Chest Pain       HISTORY [facility]  (Location/Symptom, Timing/Onset, Context/Setting, Quality, Duration, Modifying Factors,Severity.)      Andrew Montes is a 26 y.o. female who is -0-0-2 at 14 weeks and 2 days presents to the ED complaining of palpitations and chest pain. She reports palpitations have been going on for 2 weeks the chest pain started 3 to 4 hours ago. She describes intermittent, sharp, 7/10 pain radiating to the left shoulder and scapula. Patient also reports shortness of breath at rest and exertion. She reports having difficulties going up and down stairs and sometimes completing full sentences. Patient related shortness of breath and palpitations to anxiety which she had suffered with as a teenager but does not take any medications for this condition. She identify alleviating or aggravating factors. She reports being seen by cardiologist at age 16 and wearing Holter monitor for 3 days but her mother never followed up with cardiology. Patient's mother recently disclosed to patient that she may have a heart condition with unknown ideology. She was recently seen at [facility] emergency department for tachycardia and suspected urinary tract infection on 2021. Patient was treated for UTI without hematuria was started on Keflex 500 mg capsule p.o. twice daily for 7 days but reports not starting medication. SHx: Patient does report smoking half pack cigarettes throughout previous and current pregnancy. Patient denies caffeinated drinks, alcohol, or illicit drugs. FHx: Patient reports her father  at age 28 oh drug overdose.       PAST MEDICAL / SURGICAL / SOCIAL / cephALEXin (KEFLEX) 500 MG capsule Take 1 capsule by mouth 2 times daily for 7 days 1/9/21 1/16/21  Maddie Dewey PA-C       REVIEW OFSYSTEMS    (2-9 systems for level 4, 10 or more for level 5)      Review of Systems   Constitutional: Negative for chills, fatigue and fever. Eyes: Negative for photophobia and visual disturbance. Respiratory: Positive for shortness of breath. Negative for cough and wheezing. Cardiovascular: Positive for chest pain and palpitations. Negative for leg swelling. Gastrointestinal: Negative for abdominal pain, constipation, diarrhea, nausea and vomiting. Endocrine: Negative for polyuria. Genitourinary: Negative for difficulty urinating, dysuria, flank pain, hematuria, urgency and vaginal discharge. Neurological: Negative for headaches. PHYSICAL EXAM   (up to 7 for level 4, 8 or more forlevel 5)      INITIAL VITALS:   ED Triage Vitals   BP Temp Temp Source Pulse Resp SpO2 Height Weight   01/12/21 2017 01/12/21 2009 01/12/21 2009 01/12/21 2010 01/12/21 2017 01/12/21 2017 -- 01/12/21 2017   (!) 149/94 98.2 °F (36.8 °C) Skin 116 16 100 %  195 lb (88.5 kg)       Physical Exam  Constitutional:       Appearance: Normal appearance. Comments: Anxious   Cardiovascular:      Rate and Rhythm: Regular rhythm. Tachycardia present. Pulses: Normal pulses. Heart sounds: Normal heart sounds. No murmur. Pulmonary:      Effort: Pulmonary effort is normal. No respiratory distress. Breath sounds: Normal breath sounds. Abdominal:      Comments: Patient is 14 weeks pregnant   Musculoskeletal:      Right lower leg: No edema. Left lower leg: No edema. Skin:     General: Skin is warm. Neurological:      Mental Status: She is alert and oriented to person, place, and time.    Psychiatric:         Mood and Affect: Mood normal.         Behavior: Behavior normal.         DIFFERENTIAL  DIAGNOSIS     PLAN (LABS / IMAGING / EKG):  Orders Placed This Encounter   Procedures    Culture, Blood 1    Culture, Blood 1    XR CHEST PORTABLE    CBC Auto Differential    Comprehensive Metabolic Panel w/ Reflex to MG    Troponin    Brain Natriuretic Peptide    TSH with Reflex    Urinalysis, reflex to microscopic    Protein / creatinine ratio, urine    LACTIC ACID, WHOLE BLOOD    Diet NPO Effective Now    Telemetry Monitoring    Vital signs per unit routine    Notify physician    Notify physician    Up as tolerated    Up with assistance    Place intermittent pneumatic compression device    Full Code    PATIENT STATUS (FROM ED OR OR/PROCEDURAL) Observation       MEDICATIONS ORDERED:  Orders Placed This Encounter   Medications    sodium chloride flush 0.9 % injection 10 mL    sodium chloride flush 0.9 % injection 10 mL    acetaminophen (TYLENOL) tablet 650 mg    ondansetron (ZOFRAN-ODT) disintegrating tablet 4 mg       DDX: UTI without hematuria, tobacco use affecting pregnancy, anxiety, PE low suspicion    Initial MDM/Plan/ED COURSE:    26 y.o. female who is -0-0-2 at 14 weeks and 2 days who presents with chest pain and palpitations. Patient reports palpitations for 2 weeks but acute onset of chest pain radiating to left shoulder and scapula. Patient reports half pack per day tobacco use. Patient also has past medical history of anxiety which is untreated. Patient had recent hospitalization at [facility] and was treated for urinary tract infection without hematuria but was noncompliant with medication. Plan to admit to observation unit for further cardiac work-up. Labs including CBC, CMP, TSH, UA, blood cultures, lactic acid, protein/creatinine ratio, and chest x-ray ordered.           DIAGNOSTIC RESULTS / EMERGENCYDEPARTMENT COURSE / MDM     LABS:  Labs Reviewed   CBC WITH AUTO DIFFERENTIAL - Abnormal; Notable for the following components:       Result Value    WBC 20.0 (*)     Seg Neutrophils 73 (*)     Lymphocytes 20 (*)     Segs Absolute 14.52 (*)     Absolute Lymph # 4.00 (*)     All other components within normal limits   CULTURE, BLOOD 1   CULTURE, BLOOD 1   TROPONIN   BRAIN NATRIURETIC PEPTIDE   TSH WITH REFLEX   URINALYSIS   COMPREHENSIVE METABOLIC PANEL W/ REFLEX TO MG FOR LOW K   PROTEIN / CREATININE RATIO, URINE   LACTIC ACID, WHOLE BLOOD           Xr Chest Portable    Result Date: 1/12/2021  EXAMINATION: ONE XRAY VIEW OF THE CHEST 1/12/2021 9:17 pm COMPARISON: 02/04/2018 HISTORY: ORDERING SYSTEM PROVIDED HISTORY: chest pain TECHNOLOGIST PROVIDED HISTORY: chest pain Reason for Exam: upr,chest pain,high heart rate FINDINGS: The cardiomediastinal silhouette is normal in size and contour. Lungs are clear. No pleural effusion or pneumothorax is present. No acute cardiopulmonary process       EKG    All EKG's are interpreted by the Emergency Department Physicianwho either signs or Co-signs this chart in the absence of a cardiologist.      PROCEDURES:  None    CONSULTS:  None    CRITICAL CARE:  Please see attending note    FINAL IMPRESSION      Chest pain, unspecified     DISPOSITION / [address] Admitted 01/12/2021 09:15:39 PM      PATIENT REFERRED TO:  No follow-up provider specified. DISCHARGE MEDICATIONS:  New Prescriptions    No medications on file       Cristina Mandel MD  Emergency Medicine Resident    (Please note that portions of this note were completed with a voice recognition program.Efforts were made to edit the dictations but occasionally words are mis-transcribed. )       Cristina Mandel MD  Resident  01/14/21 [id number]

## 2021-01-13 NOTE — ED PROVIDER NOTES
[facility]  Emergency Department  Emergency Medicine Resident Sign-out     Care of Roberth Patel was assumed from Dr. Shashi Cabral and is being seen for Chest Pain  . The patient's initial evaluation and plan have been discussed with the prior provider who initially evaluated the patient. EMERGENCY DEPARTMENT COURSE / MEDICAL DECISION MAKING:       MEDICATIONS GIVEN:  Orders Placed This Encounter   Medications    sodium chloride flush 0.9 % injection 10 mL    sodium chloride flush 0.9 % injection 10 mL    acetaminophen (TYLENOL) tablet 650 mg    ondansetron (ZOFRAN-ODT) disintegrating tablet 4 mg       LABS / RADIOLOGY:     Labs Reviewed   CBC WITH AUTO DIFFERENTIAL - Abnormal; Notable for the following components:       Result Value    WBC 20.0 (*)     Seg Neutrophils 73 (*)     Lymphocytes 20 (*)     Segs Absolute 14.52 (*)     Absolute Lymph # 4.00 (*)     All other components within normal limits   COMPREHENSIVE METABOLIC PANEL W/ REFLEX TO MG FOR LOW K - Abnormal; Notable for the following components:    BUN 4 (*)     CREATININE 0.44 (*)     Potassium 3.6 (*)     CO2 19 (*)     Total Bilirubin <0.10 (*)     All other components within normal limits   CULTURE, BLOOD 1   CULTURE, BLOOD 1   TROPONIN   BRAIN NATRIURETIC PEPTIDE   TSH WITH REFLEX   URINALYSIS   PROTEIN / CREATININE RATIO, URINE   LACTIC ACID, WHOLE BLOOD       Xr Chest Portable    Result Date: 1/12/2021  EXAMINATION: ONE XRAY VIEW OF THE CHEST 1/12/2021 9:17 pm COMPARISON: 02/04/2018 HISTORY: ORDERING SYSTEM PROVIDED HISTORY: chest pain TECHNOLOGIST PROVIDED HISTORY: chest pain Reason for Exam: upr,chest pain,high heart rate FINDINGS: The cardiomediastinal silhouette is normal in size and contour. Lungs are clear. No pleural effusion or pneumothorax is present.      No acute cardiopulmonary process       RECENT VITALS:     Temp: 98.2 °F (36.8 °C),  Pulse: 97, Resp: 17, BP: 122/85, SpO2: 99 %      This patient is a 26 y.o. Female with Chest pain, tachycardia. About 14 weeks pregnant into third pregnancy. Workup here unremarkable. Patient has refused CT scan to r/o PE. Informed by her mother that she has some heart condition, but she is unsure what. Admitted to observation for cardiology evaluation. Awaiting transfer to the floor at this time           OUTSTANDING TASKS / RECOMMENDATIONS:    1. Awaiting transfer to floor     FINAL IMPRESSION:   No diagnosis found. DISPOSITION:         DISPOSITION:  []  Discharge   []  Transfer -    [x]  Admission - ETU    []  Against Medical Advice   []  Eloped   FOLLOW-UP: No follow-up provider specified.    DISCHARGE MEDICATIONS: New Prescriptions    No medications on file          Bridgette Charlton DO  Emergency Medicine Resident  [facility]       Bridgette Charlton, [address] [facility]  Resident  01/12/21 4147

## 2021-01-13 NOTE — CARE COORDINATION
Case Management Initial Discharge Plan  Andrew Montes,             Met with:patient to discuss discharge plans. Information verified: address, contacts, phone number, , insurance Yes    Emergency Contact/Next of Kin name & number: Hilary Brambila parent     PCP: Nghia Golden MD  Date of last visit: April     Insurance Provider: [facility] [employer]     Discharge Planning    Living Arrangements:  Spouse/Significant Other, Children   Support Systems:  Family Members, Spouse/Significant Other, Friends/Neighbors    Home has 2 stories  8 stairs to climb to get into front door, 1 flight stairs to climb to reach second floor  Location of bedroom/bathroom in home second floor     Patient able to perform ADL's:Independent    Current Services (outpatient & in home) none   DME equipment: none   DME provider: na    Receiving oral anticoagulation therapy? No    If indicated:   Physician managing anticoagulation treatment: na  Where does patient obtain lab work for ATC treatment? na      Potential Assistance Needed:  N/A    Patient agreeable to home care: No  [place] of choice provided:  n/a    Prior SNF/Rehab Placement and Facility: none   Agreeable to SNF/Rehab: No  [place] of choice provided: n/a     Evaluation: no    Expected Discharge date:       Patient expects to be discharged to:  Home  Follow Up Appointment: Best Day/ Time:      Transportation provider: [place]  Transportation arrangements needed for discharge: No    Readmission Risk              Risk of Unplanned Readmission:        0             Does patient have a readmission risk score greater than 14?: No  If yes, follow-up appointment must be made within 7 days of discharge. Goals of Care:  To get my heart checked out       Discharge Plan: Home independently           Electronically signed by Celia Viveros RN on 21 at 5:38 PM EST

## 2021-01-14 LAB
EKG ATRIAL RATE: 81 BPM
EKG P AXIS: 28 DEGREES
EKG P-R INTERVAL: 150 MS
EKG Q-T INTERVAL: 406 MS
EKG QRS DURATION: 110 MS
EKG QTC CALCULATION (BAZETT): 471 MS
EKG R AXIS: -7 DEGREES
EKG T AXIS: 51 DEGREES
EKG VENTRICULAR RATE: 81 BPM

## 2021-01-14 PROCEDURE — 93010 ELECTROCARDIOGRAM REPORT: CPT | Performed by: INTERNAL MEDICINE

## 2021-01-14 NOTE — DISCHARGE SUMMARY
CDU Discharge Summary        Patient:  Angelica Hodges  YOB: 1995    MRN: [id number]   Acct: [de-identified]    Primary Care Physician: Alyse Dunaway MD    Admit date:  1/12/2021  8:10 PM  Discharge date: 1/13/2021  5:15 PM     Discharge Diagnoses:     Acute chest pain and palpitations requiring further outpatient management. Patient discharged with a Holter monitor. Improved with rest.  Patient also noted right ear hearing loss, noted to have effusion behind temporal membrane. Patient discharged home in stable condition with outpatient cardiology and ENT follow-up    Follow-up:  Call today/tomorrow for a follow up appointment with Alyse Dunaway MD , or return to the Emergency Room with worsening symptoms    Stressed to patient the importance of following up with primary care doctor for further workup/management of symptoms. Pt verbalizes understanding and agrees with plan. Discharge Medications:  Changes to medications          Aditi Saltness   Home Medication Instructions CWU:[id number]    Printed on:01/14/21 2739   Medication Information                      cephALEXin (KEFLEX) 500 MG capsule  Take 1 capsule by mouth 2 times daily for 7 days             Prenatal Vit-Fe Fumarate-FA (PRENATAL ONE DAILY PO)  Take by mouth daily             pseudoephedrine (DECONGESTANT) 30 MG tablet  Take 1 tablet by mouth every 6 hours as needed for Congestion                 Diet:  No diet orders on file , Advance as tolerated     Activity:  As tolerated    Consultants: IP CONSULT TO CARDIOLOGY    Procedures:  Not indicated     Diagnostic Test:   Results for orders placed or performed during the hospital encounter of 01/12/21   Culture, Blood 1    Specimen: Blood   Result Value Ref Range    Specimen Description . BLOOD     Special Requests  L AC 5ML     Culture NO GROWTH 2 DAYS    Culture, Blood 1    Specimen: Blood   Result Value Ref Range    Specimen Description . BLOOD     Special Requests  R HAND 5ML     Culture NO GROWTH 2 DAYS    CBC Auto Differential   Result Value Ref Range    WBC 20.0 (H) 3.5 - 11.3 k/uL    RBC 4.49 3.95 - 5.11 m/uL    Hemoglobin 13.2 11.9 - 15.1 g/dL    Hematocrit 39.1 36.3 - 47.1 %    MCV 87.1 82.6 - 102.9 fL    MCH 29.4 25.2 - 33.5 pg    MCHC 33.8 28.4 - 34.8 g/dL    RDW 13.2 11.8 - 14.4 %    Platelets 817 545 - 903 k/uL    MPV 11.0 8.1 - 13.5 fL    NRBC Automated 0.0 0.0 per 100 WBC    Differential Type NOT REPORTED     Seg Neutrophils 73 (H) 36 - 65 %    Lymphocytes 20 (L) 24 - 43 %    Monocytes 5 3 - 12 %    Eosinophils % 2 1 - 4 %    Basophils 0 0 - 2 %    Immature Granulocytes 0 0 %    Segs Absolute 14.52 (H) 1.50 - 8.10 k/uL    Absolute Lymph # 4.00 (H) 1.10 - 3.70 k/uL    Absolute Mono # 0.89 0.10 - 1.20 k/uL    Absolute Eos # 0.43 0.00 - 0.44 k/uL    Basophils Absolute 0.05 0.00 - 0.20 k/uL    Absolute Immature Granulocyte 0.08 0.00 - 0.30 k/uL    WBC Morphology NOT REPORTED     RBC Morphology NOT REPORTED     Platelet Estimate NOT REPORTED    Comprehensive Metabolic Panel w/ Reflex to MG   Result Value Ref Range    Glucose 95 70 - 99 mg/dL    BUN 4 (L) 6 - 20 mg/dL    CREATININE 0.44 (L) 0.50 - 0.90 mg/dL    Bun/Cre Ratio NOT REPORTED 9 - 20    Calcium 9.3 8.6 - 10.4 mg/dL    Sodium 137 135 - 144 mmol/L    Potassium 3.6 (L) 3.7 - 5.3 mmol/L    Chloride 105 98 - 107 mmol/L    CO2 19 (L) 20 - 31 mmol/L    Anion Gap 13 9 - 17 mmol/L    Alkaline Phosphatase 87 35 - 104 U/L    ALT 11 5 - 33 U/L    AST 14 <32 U/L    Total Bilirubin <0.10 (L) 0.3 - 1.2 mg/dL    Total Protein 7.5 6.4 - 8.3 g/dL    Alb 3.9 3.5 - 5.2 g/dL    Albumin/Globulin Ratio 1.1 1.0 - 2.5    GFR Non-African American >60 >60 mL/min    GFR African American >60 >60 mL/min    GFR Comment          GFR Staging NOT REPORTED    Troponin   Result Value Ref Range    Troponin, High Sensitivity <6 0 - 14 ng/L    Troponin T NOT REPORTED <0.03 ng/mL    Troponin Interp NOT REPORTED    Brain Natriuretic Peptide   Result Value Ref Range    Pro-BNP <20 <300 pg/mL    BNP Interpretation Pro-BNP Reference Range:    TSH with Reflex   Result Value Ref Range    TSH 0.81 0.30 - 5.00 mIU/L   Urinalysis, reflex to microscopic   Result Value Ref Range    Color, UA YELLOW YELLOW    Turbidity UA CLEAR CLEAR    Glucose, Ur NEGATIVE NEGATIVE    Bilirubin Urine NEGATIVE NEGATIVE    Ketones, Urine NEGATIVE NEGATIVE    Specific Gravity, UA 1.016 1.005 - 1.030    Urine Hgb NEGATIVE NEGATIVE    pH, UA 7.0 5.0 - 8.0    Protein, UA NEGATIVE NEGATIVE    Urobilinogen, Urine Normal Normal    Nitrite, Urine NEGATIVE NEGATIVE    Leukocyte Esterase, Urine NEGATIVE NEGATIVE    Urinalysis Comments       Microscopic exam not performed based on chemical results unless requested in original order.    Protein / creatinine ratio, urine   Result Value Ref Range    Total Protein, Urine 15 mg/dL    Creatinine, Ur 118.0 28.0 - 217.0 mg/dL    Urine Total Protein Creatinine Ratio 0.13 0.00 - 0.20   LACTIC ACID, WHOLE BLOOD   Result Value Ref Range    Lactic Acid, Whole Blood 1.2 0.7 - 2.1 mmol/L   Troponin   Result Value Ref Range    Troponin, High Sensitivity <6 0 - 14 ng/L    Troponin T NOT REPORTED <0.03 ng/mL    Troponin Interp NOT REPORTED    Troponin   Result Value Ref Range    Troponin, High Sensitivity <6 0 - 14 ng/L    Troponin T NOT REPORTED <0.03 ng/mL    Troponin Interp NOT REPORTED    URINALYSIS WITH MICROSCOPIC   Result Value Ref Range    Color, UA YELLOW YELLOW    Turbidity UA CLOUDY (A) CLEAR    Glucose, Ur NEGATIVE NEGATIVE    Bilirubin Urine NEGATIVE NEGATIVE    Ketones, Urine TRACE (A) NEGATIVE    Specific Gravity, UA 1.021 1.005 - 1.030    Urine Hgb NEGATIVE NEGATIVE    pH, UA 7.0 5.0 - 8.0    Protein, UA NEGATIVE NEGATIVE    Urobilinogen, Urine Normal Normal    Nitrite, Urine NEGATIVE NEGATIVE    Leukocyte Esterase, Urine NEGATIVE NEGATIVE    -          WBC, UA 5 TO 10 0 - 5 /HPF    RBC, UA 20 TO 50 0 - 4 /HPF    Casts UA  0 - 8 /LPF     2 TO 5 HYALINE size. Right Ventricle Normal right ventricular size and function. Mitral Valve Normal mitral valve structure. Trivial mitral regurgitation. No mitral stenosis. Aortic Valve Aortic valve structure and function normal. Aortic valve is trileaflet. No aortic insufficiency. No aortic stenosis. Tricuspid Valve Normal tricuspid valve leaflets. No tricuspid regurgitation. No tricuspid stenosis. Insignificant tricuspid regurgitation, unable to estimate RVSP. Pulmonic Valve Pulmonic valve is normal in structure and function. Trivial pulmonic insufficiency. No evidence of pulmonic stenosis. Pericardial Effusion No pericardial effusion. Miscellaneous Normal aortic root dimension. The ascending aorta is normal in size. E/E' average = 5.7. IVC normal diameter & inspiratory collapse indicating normal RA filling pressure .  M-mode / 2D Measurements & Calculations:   LVIDd:5 cm(3.7 - 5.6 cm)         Diastolic QAOLER:804 ml  LVIDs:3 cm(2.2 - 4.0 cm)         Systolic QDIGDF:24 ml  IVSd:1 cm(0.6 - 1.1 cm)          Aortic Root:2.6 cm(2.0 - 3.7 cm)  LVPWd:1 cm(0.6 - 1.1 cm)         LA Dimension: 4 cm(1.9 - 4.0 cm)  Fractional Shortenin %       LA volume/Index: 43.23 ml /22m^2  Calculated LVEF (%): 78.4 %      LVOT:2 cm                                   RVDd:3.3 cm   Mitral:                                 Aortic   Valve Area (P1/2-Time): 5.79 cm^2       Peak Velocity: 1.66 m/s  Peak E-Wave: 0.83 m/s                   Mean Velocity: 1.18 m/s  Peak A-Wave: 0.59 m/s                   Peak Gradient: 11.02 mmHg  E/A Ratio: 1.4                          Mean Gradient: 6 mmHg  Peak Gradient: 2.74 mmHg  Mean Gradient: 2 mmHg  Deceleration Time: 127 msec             Area (continuity): 2.46 cm^2  P1/2t: 38 msec                          AV VTI: 30.2 cm   Area (continuity): 3.01 cm^2  Mean Velocity: 0.59 m/s                                           Pulmonic:                                           Peak Velocity: 1.49 m/s will not drive themselves home from the hospital if they have gotten pain killers/ narcotics earlier that day and that they will arrange for transportation on their own or work with the  for a ride. Patient counseled NOT to drive while under the influence of narcotics/ pain killers. Condition: Good    Patient stable and ready for discharge home. I have discussed plan of care with patient and they are in understanding. They were instructed to read discharge paperwork. All of their questions and concerns were addressed. Time Spent: 0 day      --  [place],   Emergency Medicine Resident Physician    This dictation was generated by voice recognition computer software. Although all attempts are made to edit the dictation for accuracy, there may be errors in the transcription that are not intended.

## 2021-01-18 LAB
CULTURE: NORMAL
CULTURE: NORMAL
Lab: NORMAL
Lab: NORMAL
SPECIMEN DESCRIPTION: NORMAL
SPECIMEN DESCRIPTION: NORMAL

## 2021-01-19 ENCOUNTER — VIRTUAL VISIT (OUTPATIENT)
Dept: INTERNAL MEDICINE CLINIC | Age: 26
End: 2021-01-19
Payer: MEDICARE

## 2021-01-19 DIAGNOSIS — F45.0 ANXIETY WITH SOMATIZATION: Primary | ICD-10-CM

## 2021-01-19 DIAGNOSIS — Z3A.15 15 WEEKS GESTATION OF PREGNANCY: ICD-10-CM

## 2021-01-19 DIAGNOSIS — R00.2 PALPITATION: ICD-10-CM

## 2021-01-19 DIAGNOSIS — F17.200 SMOKER: ICD-10-CM

## 2021-01-19 DIAGNOSIS — F41.9 ANXIETY WITH SOMATIZATION: Primary | ICD-10-CM

## 2021-01-19 PROBLEM — F41.0 ANXIETY ATTACK: Status: RESOLVED | Noted: 2019-09-03 | Resolved: 2021-01-19

## 2021-01-19 PROBLEM — R07.9 CHEST PAIN: Status: RESOLVED | Noted: 2021-01-12 | Resolved: 2021-01-19

## 2021-01-19 PROCEDURE — G8417 CALC BMI ABV UP PARAM F/U: HCPCS | Performed by: FAMILY MEDICINE

## 2021-01-19 PROCEDURE — 1111F DSCHRG MED/CURRENT MED MERGE: CPT | Performed by: FAMILY MEDICINE

## 2021-01-19 PROCEDURE — G8484 FLU IMMUNIZE NO ADMIN: HCPCS | Performed by: FAMILY MEDICINE

## 2021-01-19 PROCEDURE — 4004F PT TOBACCO SCREEN RCVD TLK: CPT | Performed by: FAMILY MEDICINE

## 2021-01-19 PROCEDURE — G8428 CUR MEDS NOT DOCUMENT: HCPCS | Performed by: FAMILY MEDICINE

## 2021-01-19 PROCEDURE — 99214 OFFICE O/P EST MOD 30 MIN: CPT | Performed by: FAMILY MEDICINE

## 2021-01-19 NOTE — PROGRESS NOTES
Subjective:          2021    TELEHEALTH EVALUATION -- Audio/Visual (During [facility]-35 public health emergency)    HPI:    Lauri Prado (:  1995) has requested an audio/video evaluation for the following concern(s):    Sinus tachycardia/palpitations  Anxiety with panic attack  Pregnant  Smoking    Review of Systems    Prior to Visit Medications    Medication Sig Taking? Authorizing Provider   cephALEXin (KEFLEX) 500 MG capsule Take 1 capsule by mouth 2 times daily for 7 days  Francois Cisse DO   pseudoephedrine (DECONGESTANT) 30 MG tablet Take 1 tablet by mouth every 6 hours as needed for Congestion  Leafy Maria Fernanda Cisse DO   Prenatal Vit-Fe Fumarate-FA (PRENATAL ONE DAILY PO) Take by mouth daily  Historical Provider, MD       Social History     Tobacco Use    Smoking status: Current Every Day Smoker     Packs/day: 0.50     Types: Cigarettes    Smokeless tobacco: Never Used    Tobacco comment: \"1/2pk/cigs/day\"   Substance Use Topics    Alcohol use: No     Alcohol/week: 0.0 standard drinks    Drug use: No        No Known Allergies    PHYSICAL EXAMINATION:  [ INSTRUCTIONS:  \"[x]\" Indicates a positive item  \"[]\" Indicates a negative item  -- DELETE ALL ITEMS NOT EXAMINED]  Vital Signs: (As obtained by patient/caregiver or practitioner observation)    Blood pressure-  Heart rate-    Respiratory rate-    Temperature-  Pulse oximetry-     Constitutional: [x] Appears well-developed and well-nourished [x] No apparent distress      [] Abnormal-   Mental status  [x] Alert and awake  [x] Oriented to person/place/time []Able to follow commands      Eyes:  EOM    [x]  Normal  [] Abnormal-  Sclera  [x]  Normal  [] Abnormal -         Discharge []  None visible  [] Abnormal -    HENT:   [x] Normocephalic, atraumatic.   [] Abnormal   [x] Mouth/Throat: Mucous membranes are moist.     External Ears [x] Normal  [] Abnormal-     Neck: [] No visualized mass Pulmonary/Chest: [x] Respiratory effort normal.  [x] No visualized signs of difficulty breathing or respiratory distresssmoker        [] Abnormal-      Musculoskeletal:   [] Normal gait with no signs of ataxia         [x] Normal range of motion of neck        [] Abnormal-       Neurological:        [x] No Facial Asymmetry (Cranial nerve 7 motor function) (limited exam to video visit)          [x] No gaze palsy        [] Abnormal-         Skin:        [x] No significant exanthematous lesions or discoloration noted on facial skin         [] Abnormal-            Psychiatric:       [] Normal Affect [] No Hallucinations anxiety, panic attacks       [] Abnormal-     Other pertinent observable physical exam findings-     ASSESSMENT/PLAN:  This is a follow-up. 67-year-old female who is 15-week primigravida was recently seen in the emergency room where was she is to have sinus tachycardia. Patient states that she was subsequently referred to and seen by cardiology with negative cardiovascular finding. Is not been placed on any medication. I had a long discussion with this individual regarding her anxiety and panic attack. He states that she has a long-term history of anxiety. However she does not feel that she is depressed. I told her to discuss with her GYN regarding possibility of SSRI if there is no contraindication  Smoking. She is counseled, quit immediately  She denies excessive alcohol or illicit drug use  She is encouraged to call for any concern  I emphasized the importance of close follow-up with the OB    No follow-ups on file. Andrew Montes is a 26 y.o. female being evaluated by a Virtual Visit (video visit) encounter to address concerns as mentioned above. A caregiver was present when appropriate. Due to this being a TeleHealth encounter (During [facility] public health emergency), evaluation of the following organ systems was limited: Vitals/Constitutional/EENT/Resp/CV/GI//MS/Neuro/Skin/Heme-Lymph-Imm. Pursuant to the emergency declaration under the [address] [place] [address] and the [employer] Act, this Virtual Visit was conducted with patient's (and/or legal guardian's) consent, to reduce the patient's risk of exposure to COVID-19 and provide necessary medical care. The patient (and/or legal guardian) has also been advised to contact this office for worsening conditions or problems, and seek emergency medical treatment and/or call 911 if deemed necessary. Patient identification was verified at the start of the visit: Yes    Total time spent on this encounter: 24 minutes    Services were provided through a video synchronous discussion virtually to substitute for in-person clinic visit. Patient and provider were located at their individual homes. --Nghia Golden MD on 1/19/2021 at 11:13 AM    An electronic signature was used to authenticate this note.        Nghia Golden MD

## 2021-12-16 ENCOUNTER — APPOINTMENT (OUTPATIENT)
Dept: GENERAL RADIOLOGY | Facility: CLINIC | Age: 26
End: 2021-12-16
Payer: MEDICARE

## 2021-12-16 ENCOUNTER — HOSPITAL ENCOUNTER (EMERGENCY)
Facility: CLINIC | Age: 26
Discharge: HOME | End: 2021-12-16
Attending: EMERGENCY MEDICINE
Payer: MEDICARE

## 2021-12-16 VITALS
SYSTOLIC BLOOD PRESSURE: 151 MMHG | OXYGEN SATURATION: 100 % | HEIGHT: 66 IN | TEMPERATURE: 98.4 F | WEIGHT: 180 LBS | HEART RATE: 104 BPM | BODY MASS INDEX: 28.93 KG/M2 | RESPIRATION RATE: 17 BRPM | DIASTOLIC BLOOD PRESSURE: 97 MMHG

## 2021-12-16 DIAGNOSIS — F41.9 ANXIETY: ICD-10-CM

## 2021-12-16 DIAGNOSIS — R07.9 CHEST PAIN, UNSPECIFIED TYPE: ICD-10-CM

## 2021-12-16 DIAGNOSIS — R42 DIZZINESS: Primary | ICD-10-CM

## 2021-12-16 LAB
-: ABNORMAL
ABSOLUTE EOS #: 0.8 K/UL (ref 0–0.4)
ABSOLUTE IMMATURE GRANULOCYTE: ABNORMAL K/UL (ref 0–0.3)
ABSOLUTE LYMPH #: 4 K/UL (ref 1–4.8)
ABSOLUTE MONO #: 0.6 K/UL (ref 0.1–1.2)
AMORPHOUS: ABNORMAL
ANION GAP SERPL CALCULATED.3IONS-SCNC: 11 MMOL/L (ref 9–17)
BACTERIA: ABNORMAL
BASOPHILS # BLD: 1 % (ref 0–2)
BASOPHILS ABSOLUTE: 0.1 K/UL (ref 0–0.2)
BILIRUBIN URINE: NEGATIVE
BUN BLDV-MCNC: 13 MG/DL (ref 6–20)
BUN/CREAT BLD: ABNORMAL (ref 9–20)
CALCIUM SERPL-MCNC: 9.7 MG/DL (ref 8.6–10.4)
CASTS UA: ABNORMAL /LPF (ref 0–2)
CHLORIDE BLD-SCNC: 103 MMOL/L (ref 98–107)
CO2: 23 MMOL/L (ref 20–31)
COLOR: YELLOW
COMMENT UA: ABNORMAL
CREAT SERPL-MCNC: 0.7 MG/DL (ref 0.5–0.9)
CRYSTALS, UA: ABNORMAL /HPF
DIFFERENTIAL TYPE: ABNORMAL
EOSINOPHILS RELATIVE PERCENT: 7 % (ref 1–4)
EPITHELIAL CELLS UA: ABNORMAL /HPF (ref 0–5)
GFR AFRICAN AMERICAN: >60 ML/MIN
GFR NON-AFRICAN AMERICAN: >60 ML/MIN
GFR SERPL CREATININE-BSD FRML MDRD: ABNORMAL ML/MIN/{1.73_M2}
GFR SERPL CREATININE-BSD FRML MDRD: ABNORMAL ML/MIN/{1.73_M2}
GLUCOSE BLD-MCNC: 78 MG/DL (ref 70–99)
GLUCOSE URINE: NEGATIVE
HCG QUALITATIVE: NEGATIVE
HCT VFR BLD CALC: 41 % (ref 36–46)
HEMOGLOBIN: 14 G/DL (ref 12–16)
IMMATURE GRANULOCYTES: ABNORMAL %
KETONES, URINE: ABNORMAL
LEUKOCYTE ESTERASE, URINE: NEGATIVE
LYMPHOCYTES # BLD: 33 % (ref 24–44)
MCH RBC QN AUTO: 29.2 PG (ref 26–34)
MCHC RBC AUTO-ENTMCNC: 34.1 G/DL (ref 31–37)
MCV RBC AUTO: 85.8 FL (ref 80–100)
MONOCYTES # BLD: 5 % (ref 2–11)
MUCUS: ABNORMAL
NITRITE, URINE: NEGATIVE
NRBC AUTOMATED: ABNORMAL PER 100 WBC
OTHER OBSERVATIONS UA: ABNORMAL
PDW BLD-RTO: 14.3 % (ref 12.5–15.4)
PH UA: 6 (ref 5–8)
PLATELET # BLD: 387 K/UL (ref 140–450)
PLATELET ESTIMATE: ABNORMAL
PMV BLD AUTO: 7.8 FL (ref 6–12)
POTASSIUM SERPL-SCNC: 3.6 MMOL/L (ref 3.7–5.3)
PROTEIN UA: ABNORMAL
RBC # BLD: 4.78 M/UL (ref 4–5.2)
RBC # BLD: ABNORMAL 10*6/UL
RBC UA: ABNORMAL /HPF (ref 0–2)
RENAL EPITHELIAL, UA: ABNORMAL /HPF
SEG NEUTROPHILS: 54 % (ref 36–66)
SEGMENTED NEUTROPHILS ABSOLUTE COUNT: 6.6 K/UL (ref 1.8–7.7)
SODIUM BLD-SCNC: 137 MMOL/L (ref 135–144)
SPECIFIC GRAVITY UA: 1.03 (ref 1–1.03)
TRICHOMONAS: ABNORMAL
TROPONIN INTERP: NORMAL
TROPONIN T: NORMAL NG/ML
TROPONIN, HIGH SENSITIVITY: <6 NG/L (ref 0–14)
TURBIDITY: CLEAR
URINE HGB: NEGATIVE
UROBILINOGEN, URINE: NORMAL
WBC # BLD: 12.1 K/UL (ref 3.5–11)
WBC # BLD: ABNORMAL 10*3/UL
WBC UA: ABNORMAL /HPF (ref 0–5)
YEAST: ABNORMAL

## 2021-12-16 PROCEDURE — 80048 BASIC METABOLIC PNL TOTAL CA: CPT

## 2021-12-16 PROCEDURE — 81001 URINALYSIS AUTO W/SCOPE: CPT

## 2021-12-16 PROCEDURE — 71045 X-RAY EXAM CHEST 1 VIEW: CPT

## 2021-12-16 PROCEDURE — 99282 EMERGENCY DEPT VISIT SF MDM: CPT

## 2021-12-16 PROCEDURE — 2580000003 HC RX 258

## 2021-12-16 PROCEDURE — 84703 CHORIONIC GONADOTROPIN ASSAY: CPT

## 2021-12-16 PROCEDURE — 36415 COLL VENOUS BLD VENIPUNCTURE: CPT

## 2021-12-16 PROCEDURE — 84484 ASSAY OF TROPONIN QUANT: CPT

## 2021-12-16 PROCEDURE — 93005 ELECTROCARDIOGRAM TRACING: CPT

## 2021-12-16 PROCEDURE — 85025 COMPLETE CBC W/AUTO DIFF WBC: CPT

## 2021-12-16 RX ORDER — 0.9 % SODIUM CHLORIDE 0.9 %
1000 INTRAVENOUS SOLUTION INTRAVENOUS ONCE
Status: COMPLETED | OUTPATIENT
Start: 2021-12-16 | End: 2021-12-16

## 2021-12-16 RX ADMIN — SODIUM CHLORIDE 1000 ML: 9 INJECTION, SOLUTION INTRAVENOUS at 18:23

## 2021-12-16 NOTE — ED PROVIDER NOTES
[facility] ED  [address]  Phone: [phone]      Attending Physician Attestation    I performed a history and physical examination of the patient and discussed management with the mid level provider. I reviewed the mid level provider's note and agree with the documented findings and plan of care. Any areas of disagreement are noted on the chart. I was personally present for the key portions of any procedures. I have documented in the chart those procedures where I was not present during the key portions. I have reviewed the emergency nurses triage note. I agree with the chief complaint, past medical history, past surgical history, allergies, medications, social and family history as documented unless otherwise noted below. Documentation of the HPI, Physical Exam and Medical Decision Making performed by mid level providers is based on my personal performance of the HPI, PE and MDM. For Physician Assistant/ Nurse Practitioner cases/documentation I have personally evaluated this patient and have completed at least one if not all key elements of the E/M (history, physical exam, and MDM). Additional findings are as noted. CHIEF COMPLAINT       Chief Complaint   Patient presents with    Dizziness         HISTORY OF PRESENT ILLNESS    Gerald Michele is a 23 y.o. female who presents with just not feeling herself for the last 2 days. She states that 2 days ago all day she just did not feel right. Yesterday there was some improvement. Today she is just had intermittent episodes where she just does not feel right. She denies having any fever or chills. She denies having any shortness of breath. She states she has this pulling feeling intermittently in her chest.  She notices it when she moves or lifts up one of the children. She denies having any headaches neck pain numbness or weakness of the arms or legs. She denies any sore throat cough or congestion now. She did have a week ago sore throat with upper respiratory congestion. Her children have also been sick. She has not been Covid vaccinated. She has not been tested for Covid and neither have any of her children. Patient does have a history of anxiety. PAST MEDICAL HISTORY    has no past medical history on file. SURGICAL HISTORY      has a past surgical history that includes Tonsillectomy and [place] tooth extraction. CURRENT MEDICATIONS       Previous Medications    PRENATAL VIT-FE FUMARATE-FA (PRENATAL ONE DAILY PO)    Take by mouth daily    PSEUDOEPHEDRINE (DECONGESTANT) 30 MG TABLET    Take 1 tablet by mouth every 6 hours as needed for Congestion       ALLERGIES     has No Known Allergies. FAMILY HISTORY     has no family status information on file. family history is not on file. SOCIAL HISTORY      reports that she has been smoking cigarettes. She has been smoking about 0.50 packs per day. She has never used smokeless tobacco. She reports that she does not drink alcohol and does not use drugs. PHYSICAL EXAM     INITIAL VITALS:  height is 5' 6\" (1.676 m) and weight is 81.6 kg (180 lb). Her temperature is 98.4 °F (36.9 °C). Her blood pressure is 151/97 (abnormal) and her pulse is 104. Her respiration is 17 and oxygen saturation is 100%. Patient is awake and alert in no acute distress. Neck is supple there is no meningeal signs or lymphadenopathy. Lungs are clear to auscultation bilateral no rales rhonchi or wheezing. Cardiac regular rate without murmur no S3 or S4. She has mild tachycardia pulse rate of 104. Abdomen is soft and nontender with positive bowel sounds. Skin is warm and dry no rashes or lesions noted. Neuro she is awake and alert she is oriented x4. There is no focal neuro deficits.       DIAGNOSTIC RESULTS     EKG: All EKG's are interpreted by the Emergency Department Physician who either signs or Co-signs this chart in the absence of a cardiologist.      Interpreted by Magdaleno Bazzi DO     Rhythm: normal sinus   Rate: normal  Axis: normal  Ectopy: none  Conduction: normal  ST Segments: no acute change  T Waves: no acute change  Q Waves: none    Clinical Impression: normal sinus rhythm with no acute changes/normal EKG. No acute infarction/ischemia noted. RADIOLOGY:   Non-plain film images such as CT, Ultrasound and MRI are read by the radiologist. Plain radiographic images are visualized and the radiologist interpretations are reviewed as follows:         LABS:  Results for orders placed or performed during the hospital encounter of 12/16/21   EKG 12 Lead   Result Value Ref Range    Ventricular Rate 97 BPM    Atrial Rate 97 BPM    P-R Interval 162 ms    QRS Duration 116 ms    Q-T Interval 386 ms    QTc Calculation (Bazett) 490 ms    P Axis 67 degrees    R Axis -27 degrees    T Axis 68 degrees           EMERGENCY DEPARTMENT COURSE:   Vitals:    Vitals:    12/16/21 1642 12/16/21 1644   BP:  (!) 151/97   Pulse:  104   Resp:  17   Temp:  98.4 °F (36.9 °C)   SpO2:  100%   Weight: 81.6 kg (180 lb)    Height: 5' 6\" (1.676 m)      -------------------------  BP: (!) 151/97, Temp: 98.4 °F (36.9 °C), Pulse: 104, Resp: 17      PERTINENT ATTENDING PHYSICIAN COMMENTS:    Patient has vague symptoms of just not feeling right for the last few days. She possibly had Covid a week ago. This could be post Covid syndrome. EKG shows a normal sinus rhythm at a rate of 97 with no acute ST elevation or T wave abnormality. Patient's CBC, CMP, and cardiac labs are normal she is safe to be discharged home with outpatient follow-up      (Please note that portions of this note were completed with a voice recognition program.  Efforts were made to edit the dictations but occasionally words are mis-transcribed. )    Wing Cornelio DO, , MD, F.A.C.E.P.   Attending Emergency Medicine Physician        Magdaleno Bazzi DO  12/16/21 4732

## 2021-12-16 NOTE — ED PROVIDER NOTES
[facility] ED  [address] [facility]  Phone: [phone]      eMERGENCY dEPARTMENT eNCOUnter      Pt Name: Nishi Anaya  MRN: [id number]  [place] 1995  Date of evaluation: 12/16/21      CHIEF COMPLAINT:  Chief Complaint   Patient presents with   Jessicajohnny Braga is a 23 y.o. female who presents with evaluation for what she describes as \"not feeling right\". She initially describes the feeling as dizziness and then stated it is more like a feeling of feeling \"foggy\". She states that this started on Monday and has been increasing over the last couple of days. She also reports that she has had a pulling sensation in her chest for a few months that she related to a pulled muscle because she is constantly lifting her children. She denies any alleviating or aggravating factors. She states that last week she was ill at home with fever, congestion and productive cough with green sputum. She continues with the productive cough and green sputum although states that it is improving. She states that her children were also sick at home. She states that she has not been vaccinated for COVID. She denies any complaints of current fever, chills, ear pain, falls, head injury, LOC, headaches, vision changes, ataxia, nasal congestion, abdominal pain, nausea, vomiting, neck or back pain, paresthesias, palpitations, shortness of breath or dyspnea. Nursing Notes were reviewed. REVIEW OF SYSTEMS       Constitutional: Denies recent fever, chills. HENT: Denies nasal congestion. Neck: Denies neck pain  Respiratory: Complaining of productive cough with green sputum. Denies recent shortness of breath. Cardiac: Complaining of pulling in her chest  GI: Denies abdominal pain, nausea, vomiting, diarrhea  : Denies dysuria.     Musculoskeletal: Denies extremity pain or myalgia   neurologic: Complaining of \"feeling foggy\"  Skin:  Denies any rash.      Negative in 10 essential Systems except as mentioned above and in the HPI. PAST MEDICAL HISTORY   PMH:  has no past medical history on file. Surgical History:  has a past surgical history that includes Tonsillectomy and [place] tooth extraction. Social History:  reports that she has been smoking cigarettes. She has been smoking about 0.50 packs per day. She has never used smokeless tobacco. She reports that she does not drink alcohol and does not use drugs. Family History: None  Psychiatric History: None    Allergies:has No Known Allergies. PHYSICAL EXAM     INITIAL VITALS: BP (!) 151/97   Pulse 104   Temp 98.4 °F (36.9 °C)   Resp 17   Ht 5' 6\" (1.676 m)   Wt 81.6 kg (180 lb)   SpO2 100%   BMI 29.05 kg/m²     Constitutional:  Well developed   Eyes:  Pupils equal and readily reactive to light. EOMI. HENT:  Atraumatic, Nose normal, External ears normal, Oropharynx moist.   Neck:  No midline pain. Full ROM. Respiratory:  Clear to auscultation bilaterally with good air exchange, no W/R/R  Cardiovascular:  RRR with normal S1 and S2  Gastrointestinal/Abdomen:  Soft, NT.  BS present. Musculoskeletal:    Full ROM and NV intact x4. Back:   No midline pain. Full ROM. No CVA tenderness. Integument:   No rash. Neurologic:  Alert, appropriate mentation/interaction, no focal deficits noted       DIAGNOSTIC RESULTS     EKG: All EKG's are interpreted by the Emergency Department Physician who either signs or Co-signs this chart in the absence of a cardiologist.  Not indicated, or per attending note    RADIOLOGY:   Reviewed the radiologist:  XR CHEST PORTABLE   Final Result   No cardiomegaly, pneumonia or interstitial edema. No significant change from 01/12/2021. XR CHEST PORTABLE    Result Date: 12/16/2021  EXAMINATION: ONE XRAY VIEW OF THE CHEST 12/16/2021 6:25 pm COMPARISON: 01/12/2021.  HISTORY: ORDERING SYSTEM PROVIDED HISTORY: cough TECHNOLOGIST PROVIDED HISTORY: cough reassuring. Routine discharge counseling was given, and the patient understands that worsening, changing or persistent symptoms should prompt an immediate call or follow up with their primary physician or return to the emergency department. The importance of appropriate follow up was also discussed. I have reviewed the disposition diagnosis with the patient and or their family/guardian. I have answered their questions and given discharge instructions. They voiced understanding of these instructions and did not have any further questions or complaints. This pt presents with the no signs of central etiology- ataxia, significant HA, localized weakness or other neurosensory deficits. Orders Placed This Encounter   Medications    0.9 % sodium chloride bolus       CONSULTS:  None      FINAL IMPRESSION      1. Dizziness    2. Chest pain, unspecified type    3.  Anxiety          DISPOSITION/PLAN:  DISPOSITION          PATIENT REFERRED TO:  John Lares MD  [address] [address] [facility]  [phone]    Call in 2 days      [facility]  [address]  [phone]    As needed      [address]:  New Prescriptions    No medications on file       (Please note that portions of this note were completed with a voice recognition program.  Efforts were made to edit the dictations but occasionally words are mis-transcribed.)    Jassi Herzog, APRN - [address] [facility] [facility], APRN - CNP  12/16/21 2024

## 2021-12-17 LAB
EKG ATRIAL RATE: 97 BPM
EKG P AXIS: 67 DEGREES
EKG P-R INTERVAL: 162 MS
EKG Q-T INTERVAL: 386 MS
EKG QRS DURATION: 116 MS
EKG QTC CALCULATION (BAZETT): 490 MS
EKG R AXIS: -27 DEGREES
EKG T AXIS: 68 DEGREES
EKG VENTRICULAR RATE: 97 BPM

## 2022-01-04 ENCOUNTER — TELEMEDICINE (OUTPATIENT)
Dept: INTERNAL MEDICINE CLINIC | Age: 27
End: 2022-01-04
Payer: MEDICARE

## 2022-01-04 DIAGNOSIS — F45.0 ANXIETY WITH SOMATIZATION: Primary | ICD-10-CM

## 2022-01-04 DIAGNOSIS — Z28.21 INFLUENZA VACCINATION DECLINED: ICD-10-CM

## 2022-01-04 DIAGNOSIS — F17.200 SMOKER: ICD-10-CM

## 2022-01-04 DIAGNOSIS — F41.9 ANXIETY WITH SOMATIZATION: Primary | ICD-10-CM

## 2022-01-04 DIAGNOSIS — Z28.21 COVID-19 VACCINATION DECLINED: ICD-10-CM

## 2022-01-04 PROBLEM — Z3A.15 15 WEEKS GESTATION OF PREGNANCY: Status: RESOLVED | Noted: 2021-01-19 | Resolved: 2022-01-04

## 2022-01-04 PROCEDURE — 4004F PT TOBACCO SCREEN RCVD TLK: CPT | Performed by: FAMILY MEDICINE

## 2022-01-04 PROCEDURE — G8484 FLU IMMUNIZE NO ADMIN: HCPCS | Performed by: FAMILY MEDICINE

## 2022-01-04 PROCEDURE — G8417 CALC BMI ABV UP PARAM F/U: HCPCS | Performed by: FAMILY MEDICINE

## 2022-01-04 PROCEDURE — 99214 OFFICE O/P EST MOD 30 MIN: CPT | Performed by: FAMILY MEDICINE

## 2022-01-04 PROCEDURE — G8427 DOCREV CUR MEDS BY ELIG CLIN: HCPCS | Performed by: FAMILY MEDICINE

## 2022-01-04 RX ORDER — BUSPIRONE HYDROCHLORIDE 5 MG/1
5 TABLET ORAL 3 TIMES DAILY PRN
Qty: 60 TABLET | Refills: 0 | Status: SHIPPED | OUTPATIENT
Start: 2022-01-04 | End: 2022-02-03

## 2022-01-04 SDOH — ECONOMIC STABILITY: FOOD INSECURITY: WITHIN THE PAST 12 MONTHS, YOU WORRIED THAT YOUR FOOD WOULD RUN OUT BEFORE YOU GOT MONEY TO BUY MORE.: NEVER TRUE

## 2022-01-04 SDOH — ECONOMIC STABILITY: FOOD INSECURITY: WITHIN THE PAST 12 MONTHS, THE FOOD YOU BOUGHT JUST DIDN'T LAST AND YOU DIDN'T HAVE MONEY TO GET MORE.: NEVER TRUE

## 2022-01-04 ASSESSMENT — PATIENT HEALTH QUESTIONNAIRE - PHQ9
SUM OF ALL RESPONSES TO PHQ QUESTIONS 1-9: 1
DEPRESSION UNABLE TO ASSESS: PT REFUSES
SUM OF ALL RESPONSES TO PHQ QUESTIONS 1-9: 1
SUM OF ALL RESPONSES TO PHQ9 QUESTIONS 1 & 2: 1
2. FEELING DOWN, DEPRESSED OR HOPELESS: 1
SUM OF ALL RESPONSES TO PHQ QUESTIONS 1-9: 1
1. LITTLE INTEREST OR PLEASURE IN DOING THINGS: 0
SUM OF ALL RESPONSES TO PHQ QUESTIONS 1-9: 1

## 2022-01-04 ASSESSMENT — SOCIAL DETERMINANTS OF HEALTH (SDOH): HOW HARD IS IT FOR YOU TO PAY FOR THE VERY BASICS LIKE FOOD, HOUSING, MEDICAL CARE, AND HEATING?: NOT HARD AT ALL

## 2022-01-04 NOTE — PROGRESS NOTES
Subjective:      Patient ID: Taylor Barba is a 23 y.o. female. 2022    TELEHEALTH EVALUATION -- Audio/Visual (During ZDEYX-95 public health emergency)    HPI:    Taylor Barba (:  1995) has requested an audio/video evaluation for the following concern(s): Anxiety with panic attack  Hyperventilation syndrome  Somatization  Declined Covid and influenza vaccination  Smoker    Review of Systems    Prior to Visit Medications    Medication Sig Taking? Authorizing Provider   busPIRone (BUSPAR) 5 MG tablet Take 1 tablet by mouth 3 times daily as needed (Anxiety) Yes Marlen Aguilera MD       Social History     Tobacco Use    Smoking status: Current Every Day Smoker     Packs/day: 0.50     Types: Cigarettes    Smokeless tobacco: Never Used    Tobacco comment: \"1/2pk/cigs/day\"   Vaping Use    Vaping Use: Never used   Substance Use Topics    Alcohol use: No     Alcohol/week: 0.0 standard drinks    Drug use: No        No Known Allergies    PHYSICAL EXAMINATION:  [ INSTRUCTIONS:  \"[x]\" Indicates a positive item  \"[]\" Indicates a negative item  -- DELETE ALL ITEMS NOT EXAMINED]  Vital Signs: (As obtained by patient/caregiver or practitioner observation)    Blood pressure-  Heart rate-    Respiratory rate-    Temperature-  Pulse oximetry-     Constitutional: [x] Appears well-developed and well-nourished [] No apparent distress      [] Abnormal-   Mental status  [x] Alert and awake  [x] Oriented to person/place/time [x]Able to follow commands      Eyes:  EOM    [x]  Normal  [] Abnormal-  Sclera  [x]  Normal  [] Abnormal -         Discharge [x]  None visible  [] Abnormal -    HENT:   [x] Normocephalic, atraumatic.   [] Abnormal   [x] Mouth/Throat: Mucous membranes are moist.     External Ears [x] Normal  [] Abnormal-     Neck: [x] No visualized mass     Pulmonary/Chest: [] Respiratory effort normal.  [] No visualized signs of difficulty breathing or respiratory distress        [x] Abnormal-smoker  Musculoskeletal:   [] Normal gait with no signs of ataxia         [] Normal range of motion of neck        [x] Abnormal-musculoskeletal pain    Neurological:        [x] No Facial Asymmetry (Cranial nerve 7 motor function) (limited exam to video visit)          [x] No gaze palsy        [] Abnormal-         Skin:        [x] No significant exanthematous lesions or discoloration noted on facial skin         [] Abnormal-            Psychiatric:       [] Normal Affect [] No Hallucinations        [x] Abnormal-generalized anxiety disorder  Other pertinent observable physical exam findings-     ASSESSMENT/PLAN:  71-year-old female 6 months post delivery. History of anxiety with somatization, hyperventilation syndrome. She denies suicidality or homicidality. Patient states that she lives with her boyfriend however she is emotionally distant from her family. After long discussion she agrees to be placed on medication on as needed basis only. She also wished to proceed with psych consultation. Patient states that she is for a fall of any medication and its side effects. Reassurance provided. Call if any. She is placed on BuSpar 5 mg p.o. 3 times daily as needed. However she understand that in the long run she would benefit from SSRI and coordination with her mental health services  Smoking. Once again she is counseled, quit date, alternative to consider. She denies excessive alcohol or illicit drug use  Patient states that she is not breast-feeding her 10month-old child. She declined influenza and Covid vaccination  Med list unavailable labs reviewed, discussed with patient, questions answered  Follow-up in 4 to 6 weeks      No follow-ups on file. Franci Mejia, was evaluated through a synchronous (real-time) audio-video encounter. The patient (or guardian if applicable) is aware that this is a billable service. Verbal consent to proceed has been obtained within the past 12 months.  The visit was conducted pursuant to the emergency declaration under the [facility], [address] [place] [address] authority and the [employer] and [employer] Act. Patient identification was verified, and a caregiver was present when appropriate. The patient was located in a state where the provider was credentialed to provide care. Total time spent on this encounter: 24 minutes    --Shilpi Ta MD on 1/4/2022 at 9:48 AM    An electronic signature was used to authenticate this note. This note is created with a voice recognition program and while intend to generate a document that accurately reflects the content of the visit, no guarantee can be provided that every mistake has been identified and corrected by editing.        Shilpi Ta MD

## 2022-01-26 ENCOUNTER — OFFICE VISIT (OUTPATIENT)
Dept: INTERNAL MEDICINE CLINIC | Age: 27
End: 2022-01-26
Payer: MEDICARE

## 2022-01-26 VITALS
HEIGHT: 66 IN | WEIGHT: 196.2 LBS | OXYGEN SATURATION: 100 % | TEMPERATURE: 97.7 F | SYSTOLIC BLOOD PRESSURE: 128 MMHG | DIASTOLIC BLOOD PRESSURE: 78 MMHG | HEART RATE: 93 BPM | BODY MASS INDEX: 31.53 KG/M2

## 2022-01-26 DIAGNOSIS — F45.0 ANXIETY WITH SOMATIZATION: ICD-10-CM

## 2022-01-26 DIAGNOSIS — R63.8 INCREASED BMI: ICD-10-CM

## 2022-01-26 DIAGNOSIS — Z28.21 INFLUENZA VACCINATION DECLINED: ICD-10-CM

## 2022-01-26 DIAGNOSIS — F41.9 ANXIETY WITH SOMATIZATION: ICD-10-CM

## 2022-01-26 DIAGNOSIS — Z53.20 PATIENT DECLINES TO TAKE MEDICATION: ICD-10-CM

## 2022-01-26 DIAGNOSIS — Z28.21 COVID-19 VACCINATION DECLINED: ICD-10-CM

## 2022-01-26 DIAGNOSIS — J45.20 MILD INTERMITTENT ASTHMA WITHOUT COMPLICATION: Primary | ICD-10-CM

## 2022-01-26 DIAGNOSIS — F17.200 SMOKER: ICD-10-CM

## 2022-01-26 PROCEDURE — G8484 FLU IMMUNIZE NO ADMIN: HCPCS | Performed by: FAMILY MEDICINE

## 2022-01-26 PROCEDURE — G8417 CALC BMI ABV UP PARAM F/U: HCPCS | Performed by: FAMILY MEDICINE

## 2022-01-26 PROCEDURE — 4004F PT TOBACCO SCREEN RCVD TLK: CPT | Performed by: FAMILY MEDICINE

## 2022-01-26 PROCEDURE — G8427 DOCREV CUR MEDS BY ELIG CLIN: HCPCS | Performed by: FAMILY MEDICINE

## 2022-01-26 PROCEDURE — 99214 OFFICE O/P EST MOD 30 MIN: CPT | Performed by: FAMILY MEDICINE

## 2022-01-26 RX ORDER — ALBUTEROL SULFATE 90 UG/1
2 AEROSOL, METERED RESPIRATORY (INHALATION) 4 TIMES DAILY PRN
Qty: 18 G | Refills: 0 | Status: SHIPPED | OUTPATIENT
Start: 2022-01-26

## 2022-01-26 ASSESSMENT — ENCOUNTER SYMPTOMS
SHORTNESS OF BREATH: 1
COUGH: 1
ALLERGIC/IMMUNOLOGIC NEGATIVE: 1
EYES NEGATIVE: 1
GASTROINTESTINAL NEGATIVE: 1

## 2022-01-26 NOTE — PROGRESS NOTES
Subjective:      Patient ID: Khushi Ferro is a 23 y.o. female. Asthma  She complains of cough and shortness of breath. This is a recurrent problem. The current episode started more than 1 month ago. The problem occurs intermittently. The problem has been waxing and waning. The cough is non-productive. Her symptoms are aggravated by emotional stress, exposure to fumes and exposure to smoke. Her symptoms are alleviated by anxiolytics. Risk factors for lung disease include smoking/tobacco exposure. Her past medical history is significant for asthma. Review of Systems   Constitutional: Negative. HENT: Negative. Eyes: Negative. Respiratory: Positive for cough and shortness of breath. Cardiovascular: Negative. Gastrointestinal: Negative. Endocrine: Negative. Musculoskeletal: Negative. Skin: Negative. Allergic/Immunologic: Negative. Neurological: Negative. Hematological: Negative. Psychiatric/Behavioral: Positive for dysphoric mood. The patient is nervous/anxious. Past family and social history unremarkable. Diagnosis Orders   1. Mild intermittent asthma without complication     2. Anxiety with somatization     3. Smoker     4. COVID-19 vaccination declined     5. Influenza vaccination declined     6. Patient declines to take medication     7. Increased BMI           Objective:   Physical Exam  Vitals and nursing note reviewed. Constitutional:       Appearance: She is well-developed. Comments: Increased BMI   HENT:      Head: Normocephalic and atraumatic. Right Ear: External ear normal.      Left Ear: External ear normal.   Eyes:      Conjunctiva/sclera: Conjunctivae normal.      Pupils: Pupils are equal, round, and reactive to light. Cardiovascular:      Rate and Rhythm: Normal rate and regular rhythm. Heart sounds: Normal heart sounds. Pulmonary:      Effort: Pulmonary effort is normal.      Breath sounds: Normal breath sounds. alcohol or illicit drug use  Available labs reviewed, discussed with patient, questions answered  Follow-up in 2 months  She would benefit from establishing with a local mental health facility with close follow-up  She is encouraged to call for any concern. This note is created with a voice recognition program and while intend to generate a document that accurately reflects the content of the visit, no guarantee can be provided that every mistake has been identified and corrected by editing.        Jovita Patel MD

## 2022-01-27 ENCOUNTER — TELEMEDICINE (OUTPATIENT)
Dept: BEHAVIORAL/MENTAL HEALTH CLINIC | Age: 27
End: 2022-01-27
Payer: MEDICARE

## 2022-01-27 DIAGNOSIS — F41.9 ANXIETY DISORDER, UNSPECIFIED TYPE: Primary | ICD-10-CM

## 2022-01-27 PROCEDURE — 90791 PSYCH DIAGNOSTIC EVALUATION: CPT | Performed by: SOCIAL WORKER

## 2022-01-27 NOTE — PATIENT INSTRUCTIONS
1. Pt. Will identify and participate in positive activities to increase self-esteem  2. Utilize positive supports system  3. Pt will work towards improving self-care  4.  Pt will use daily positive self-talk

## 2022-01-27 NOTE — PROGRESS NOTES
ADULT BEHAVIORAL HEALTH       Visit Date: 1/27/2022   Time of appointment:  11:00am   Time spent with Patient: 60 minutes. This is patient's Initial appointment. Reason for Consult:  Anxiety    Referring Provider/PCP:     MD Henny Grace MD      Pt provided informed consent for the behavioral health program. Discussed with patient model of service to include the limits of confidentiality (i.e. abuse reporting, suicide intervention, etc.) and short-term intervention focused approach. Pt indicated understanding. PRESENTING PROBLEM AND HISTORY  Karolina Tamez is a 23 y.o. female who presents for new evaluation and treatment of anxiety. She has the following symptoms: lack of motivation, low self-esteem, isolating self, racing thoughts/flight of ideas, feeling nervous, anxious, or on edge, racing worry thoughts, excessive anxiety and worry about specific stressors, excessive worry about a number of events or activities , inability to stop or control worry, relationship issues and family conflict. Onset of symptoms was approximately 10 years ago. Symptoms have been gradually worsening since that time. She denies current suicidal and homicidal ideation. Family history significant for substance abuse. Risk factors: positive family history in  father and mother. Previous treatment includes therapy. Pt reports frequently being bothered by anxiety and feeling overwhelmed. Pt reports having relationship and family conflicts as well. Most days pt reports being bothered by feeling on edge. Pt reports living in a house with her fiance (Julio) Pt reports having been in relationship with him for nearly 3 years. Pt. Reports having three biological children. Pt reports having two sons (11 month old, and 10year old) and one daughter (5). Pt. Reports having custody of two of her younger brothers (16 autistic, 15). Pt. Reports this is very difficult situation. Pt reports her fiance has 5 children as well. Pt reports feeling overwhelmed at times. Pt described a great deal of stress in caring for her 15year old son who has behavior issues. MENTAL STATUS EXAM  Mood was anxious with anxious affect. Suicidal ideation was denied. Homicidal ideation was denied. Hygiene was fair . Dress was appropriate. Behavior was Within Normal Limits with No observation of difficulties ambulating. Attitude was Cooperative. Eye-contact was good. Speech: rate - WNL, rhythm -  WNL, volume - WNL  Verbalizations were coherent. Thought processes were intact and goal-oriented without evidence of delusions, hallucinations, obsessions, or tejal; with little cognitive distortions. Associations were characterized by intact cognitive processes. Pt was oriented to person, place, time, and general circumstances;  recent:  good. IInsight and judgment were estimated to be good, AEB, a fair  understanding of cyclical maladaptive patterns, and the ability to use insight to inform behavior change. CURRENT MEDICATIONS    Current Outpatient Medications:     albuterol sulfate HFA (VENTOLIN HFA) 108 (90 Base) MCG/ACT inhaler, Inhale 2 puffs into the lungs 4 times daily as needed for Wheezing, Disp: 18 g, Rfl: 0    Spacer/Aero-Holding Chambers CHAITANYA, 1 Device by Does not apply route daily as needed (asthma), Disp: 1 each, Rfl: 0    busPIRone (BUSPAR) 5 MG tablet, Take 1 tablet by mouth 3 times daily as needed (Anxiety) (Patient not taking: Reported on 1/26/2022), Disp: 60 tablet, Rfl: 0     FAMILY MEDICAL/MH HISTORY   Her family history includes Asthma in her mother. PATIENT MENTAL HEALTH HISTORY  PT. Reports never having taken [address] medicatoin but did see a therapist several times. Pt. Reports being bothered by anxiety off and on for the past 10 years. PSYCHOSOCIAL HISTORY  Current living situation: Pt reports living in a house with her fiance (Enrrique Brady) Pt reports having been in relationship with him for nearly 3 years.   Pt. Reports having three biological children. Pt reports having two sons (11 month old, and 10year old) and one daughter (5). Pt. Reports having custody of two of her younger brothers (16 autistic, 15). Pt. Reports this is very difficult situation. Pt reports her fiadonay has 5 children as well. Work/Education: Pt reports not working at this time. Pt reports in the past being a  at [employer] for 2 years. 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 Support system: Pt. Reports her fiance is supportive as well as his grandmother. Spiritism/Spirituality: Pt. Reports she is Congregational but she does not attend Denominational. DRUG AND ALCOHOL CURRENT USE/HISTORY  TOBACCO:  She reports that she has been smoking cigarettes. She has been smoking about 0.50 packs per day. She has never used smokeless tobacco.  ALCOHOL:  She reports no history of alcohol use. OTHER SUBSTANCES: She reports no history of drug use. ASSESSMENT  Diamante Ruth presented to the appointment today for evaluation and treatment of symptoms of    Diagnosis Orders   1. Anxiety disorder, unspecified type          She is currently deemed no risk to herself or others and meets criteria for Anxiety disorder unspecified type. She could benefit from a medication evaluation to assess if anxiety medications could be helpful in treating anxiety symptoms. However, at this time pt would prefer not to take mental health medication. Manasa's anxiety symptoms are somewhat bothersome at this time. She will also benefit from brief and solution-focused consultation to address cognitive and behavioral interventions for anxiety symptoms. Eleni Canavan was in agreement with recommendations.       PHQ Scores 1/4/2022 9/3/2019 11/15/2018   PHQ2 Score 1 0 0   PHQ9 Score 1 0 0     Interpretation of Total Score Depression Severity: 1-4 = Minimal depression, 5-9 = Mild depression, 10-14 = Moderate depression, 15-19 = Moderately severe depression, 20-27 = Severe depression    How often pt has had thoughts of death or hurting self (if PHQ positive for depression):       No flowsheet data found. Interpretation of TARIQ-7 score: 5-9 = mild anxiety, 10-14 = moderate anxiety, 15+ = severe anxiety. Recommend referral to behavioral health for scores 10 or greater. DIAGNOSIS/Plan  1. Anxiety disorder, unspecified type       Return in about 2 weeks (around 2/10/2022). INTERVENTION  Practiced assertive communication, Trained in relaxation strategies, Discussed potential barriers to change, Established rapport, Conducted functional assessment and Supportive techniques    INTERACTIVE COMPLEXITY  Is interactive complexity present? No  Reason:  N/A  Additional Supporting Information:  N/A     Marlee Murders, was evaluated through a synchronous (real-time) audio-video encounter. The patient (or guardian if applicable) is aware that this is a billable service. Verbal consent to proceed has been obtained within the past 12 months. The visit was conducted pursuant to the emergency declaration under the [address] [place] [address] authority and the [employer] General Act. Patient identification was verified, and a caregiver was present when appropriate. The patient was located in a state where the provider was credentialed to provide care.      Electronically signed by THERESA Bloom on 1/27/22 at 10:58 AM EST

## 2022-02-09 NOTE — PROGRESS NOTES
ADULT BEHAVIORAL HEALTH FOLLOW UP      Visit Date: 2/10/2022   Time of appointment:  11:00am   Time spent with Patient: 60 minutes. This is patient's second appointment. Reason for Consult:  Anxiety    Referring Provider/PCP:    No ref. provider found  Deepa Asencio MD      Pt provided informed consent for the behavioral health program. Discussed with patient model of service to include the limits of confidentiality (i.e. abuse reporting, suicide intervention, etc.) and short-term intervention focused approach. Pt. And staff discussed pts goal to reduce anxiety and decrease anxiety AEB pt reducing GAD7 score from an 18 to a 9. Pt. Noted feeling paranoid at times regarding thinking about potential health issues. Pt noted that she feels she cannot breath at times. Pt. Noted that she feels easily overwhelmed at times. 5-5-5 breathing techniques practiced in session to promote relaxation. Pt indicated understanding. Madelaine Gore is a 23 y.o. female who presents for follow up of anxiety    Previous Recommendations: Attend future therapy appointments. MENTAL STATUS EXAM  Mood was anxious with anxious affect. Suicidal ideation was denied. Homicidal ideation was denied. Hygiene was fair . Dress was appropriate. Behavior was Within Normal Limits with No observation of difficulties ambulating. Attitude was Cooperative. Eye-contact was fair. Speech: rate - WNL, rhythm - WNL, volume - WNL. Verbalizations were coherent. Thought processes were intact and goal-oriented without evidence of delusions, hallucinations, obsessions, or tejal; with little cognitive distortions. Associations were characterized by intact cognitive processes. Pt was oriented to person, place, time, and general circumstances;  recent:  fair. Insight and judgment were estimated to be fair, AEB, a fair  understanding of cyclical maladaptive patterns, and the ability to use insight to inform behavior change. ASSESSMENT  Noel Foy presented to the appointment today for evaluation and treatment of symptoms of  . She is currently deemed no risk to herself or others and meets criteria for anxiety unspecified. Neptali Sidhu was in agreement with recommendations. PHQ Scores 1/4/2022 9/3/2019 11/15/2018   PHQ2 Score 1 0 0   PHQ9 Score 1 0 0     Interpretation of Total Score Depression Severity: 1-4 = Minimal depression, 5-9 = Mild depression, 10-14 = Moderate depression, 15-19 = Moderately severe depression, 20-27 = Severe depression    How often pt has had thoughts of death or hurting self (if PHQ positive for depression):       No flowsheet data found. Interpretation of TARIQ-7 score: 5-9 = mild anxiety, 10-14 = moderate anxiety, 15+ = severe anxiety. Recommend referral to behavioral health for scores 10 or greater. DIAGNOSIS/PLAN  1. Anxiety         INTERVENTION  Practiced assertive communication, Trained in relaxation strategies, Established rapport and Supportive techniques      INTERACTIVE COMPLEXITY  Is interactive complexity present?   No  Reason:  N/A  Additional Supporting Information:  N/A       Electronically signed by THERESA Milton on 2/9/2022 at 12:29 PM

## 2022-02-10 ENCOUNTER — TELEMEDICINE (OUTPATIENT)
Dept: BEHAVIORAL/MENTAL HEALTH CLINIC | Age: 27
End: 2022-02-10
Payer: MEDICARE

## 2022-02-10 DIAGNOSIS — F41.9 ANXIETY: Primary | ICD-10-CM

## 2022-02-10 PROCEDURE — 90837 PSYTX W PT 60 MINUTES: CPT | Performed by: SOCIAL WORKER

## 2022-02-10 ASSESSMENT — ANXIETY QUESTIONNAIRES
IF YOU CHECKED OFF ANY PROBLEMS ON THIS QUESTIONNAIRE, HOW DIFFICULT HAVE THESE PROBLEMS MADE IT FOR YOU TO DO YOUR WORK, TAKE CARE OF THINGS AT HOME, OR GET ALONG WITH OTHER PEOPLE: EXTREMELY DIFFICULT
7. FEELING AFRAID AS IF SOMETHING AWFUL MIGHT HAPPEN: 3
3. WORRYING TOO MUCH ABOUT DIFFERENT THINGS: 3
2. NOT BEING ABLE TO STOP OR CONTROL WORRYING: 3
6. BECOMING EASILY ANNOYED OR IRRITABLE: 3
GAD7 TOTAL SCORE: 18
1. FEELING NERVOUS, ANXIOUS, OR ON EDGE: 3
5. BEING SO RESTLESS THAT IT IS HARD TO SIT STILL: 1
4. TROUBLE RELAXING: 2

## 2022-02-10 NOTE — PATIENT INSTRUCTIONS
5-5-5 breathing techniques, 5, 4,3,2,1 mindfulness techniques and focusing on daily positive thinking patterns.

## 2022-08-02 ENCOUNTER — APPOINTMENT (OUTPATIENT)
Dept: GENERAL RADIOLOGY | Facility: CLINIC | Age: 27
End: 2022-08-02
Payer: MEDICARE

## 2022-08-02 ENCOUNTER — HOSPITAL ENCOUNTER (EMERGENCY)
Facility: CLINIC | Age: 27
Discharge: HOME | End: 2022-08-02
Attending: EMERGENCY MEDICINE
Payer: MEDICARE

## 2022-08-02 VITALS
TEMPERATURE: 99 F | HEART RATE: 99 BPM | RESPIRATION RATE: 16 BRPM | OXYGEN SATURATION: 99 % | BODY MASS INDEX: 28.93 KG/M2 | DIASTOLIC BLOOD PRESSURE: 93 MMHG | SYSTOLIC BLOOD PRESSURE: 148 MMHG | HEIGHT: 66 IN | WEIGHT: 180 LBS

## 2022-08-02 DIAGNOSIS — U07.1 COVID-19: Primary | ICD-10-CM

## 2022-08-02 DIAGNOSIS — R05.9 COUGH: ICD-10-CM

## 2022-08-02 LAB — D-DIMER QUANTITATIVE: 0.47 MG/L FEU

## 2022-08-02 PROCEDURE — 71045 X-RAY EXAM CHEST 1 VIEW: CPT

## 2022-08-02 PROCEDURE — 85379 FIBRIN DEGRADATION QUANT: CPT

## 2022-08-02 PROCEDURE — 99284 EMERGENCY DEPT VISIT MOD MDM: CPT

## 2022-08-02 PROCEDURE — 36415 COLL VENOUS BLD VENIPUNCTURE: CPT

## 2022-08-02 NOTE — ED NOTES
Pt to ED with c/o a cough. Pt reports she tested positive for COVID 3 days ago. Pt reports she has been \"coughing up streaks of blood. \"  Pt denies any pain or any other complaints at this time. Pt A&Ox4. Pt sitting on cot. RR even and non labored. NAD noted at this time. Call light within reach.        Alek Mcgarry RN  08/02/22 [id number]

## 2022-08-02 NOTE — ED PROVIDER NOTES
[address] ENCOUNTER      Pt Name: Lorna Rodriges  MRN: [id number]  [place] 1995  Date of evaluation: 8/2/2022      CHIEF COMPLAINT       Chief Complaint   Patient presents with    Cough         HISTORY OF PRESENT ILLNESS      The patient presents with COVID. She was diagnosed with COVID on Saturday. Her symptoms started that day. It is now Tuesday. She has coughed up a little bit of blood-streaked phlegm and she called her doctor. They directed her to the emergency department. She says she is actually feeling better today. She has not had diarrhea or vomiting. She is able to eat and drink normally. She is not feeling short of breath or having chest pain. Her oxygen saturation is normal.      REVIEW OF SYSTEMS       All systems reviewed and negative unless noted in HPI. The patient has mild chills. She was diagnosed with COVID-19. Denies vision change. Mild rhinorrhea and sore throat. Denies any neck pain or stiffness. Denies chest pain or shortness of breath. Cough productive of blood-streaked phlegm as noted in HPI. No nausea,  vomiting or diarrhea. Denies any dysuria. Denies urinary frequency or hematuria. Denies musculoskeletal injury or pain. Denies any weakness, numbness or focal neurologic deficit. Denies any skin rash or edema. No recent psychiatric issues. No easy bruising or bleeding. Denies any polyuria, polydypsia or history of immunocompromise. PAST MEDICAL HISTORY    has a past medical history of Mild intermittent asthma without complication. SURGICAL HISTORY      has a past surgical history that includes Tonsillectomy and [place] tooth extraction.     CURRENT MEDICATIONS       Previous Medications    ALBUTEROL SULFATE HFA (VENTOLIN HFA) 108 (90 BASE) MCG/ACT INHALER    Inhale 2 puffs into the lungs 4 times daily as needed for Wheezing    SPACER/AERO-HOLDING CHAMBERS CHAITANYA    1 Device by Does SYSTEM PROVIDED HISTORY: covid   TECHNOLOGIST PROVIDED HISTORY:   covid   Reason for Exam: Pt c/o productive cough. Pt tested positive for covid 3 days   ago     FINDINGS:   AP portable view of the chest time stamped at 1550 hours is submitted. Heart   size is normal.  No vascular congestion, focal consolidation, effusion, or   pneumothorax is noted. Osseous and mediastinal structures are   age-appropriate. LABS:  Results for orders placed or performed during the hospital encounter of 08/02/22   D-Dimer, Quantitative   Result Value Ref Range    D-Dimer, Quant 0.47 mg/L FEU         EMERGENCY DEPARTMENT COURSE:   Vitals:    Vitals:    08/02/22 1516   BP: (!) 148/93   Pulse: 99   Resp: 16   Temp: 99 °F (37.2 °C)   TempSrc: Oral   SpO2: 99%   Weight: 81.6 kg (180 lb)   Height: 5' 6\" (1.676 m)     -------------------------  BP: (!) 148/93, Temp: 99 °F (37.2 °C), Heart Rate: 99, Resp: 16      Re-evaluation Notes    There is no pneumonia on x-ray. The patient's D-dimer is normal.  My index of suspicion for PE is low. The patient has COVID-19 and should isolate at home. She may take over-the-counter cold remedies. She is discharged in good condition. FINAL IMPRESSION      1. COVID-19    2.  Cough          DISPOSITION/PLAN   DISPOSITION Decision To Discharge 08/02/2022 04:33:22 PM      Condition on Disposition    good    PATIENT REFERRED TO:  Harriet Tian MD  [address] [place] [id number]    In 1 week  As needed    DISCHARGE MEDICATIONS:  New Prescriptions    No medications on file       (Please note that portions of this note were completed with a voice recognition program.  Efforts were made to edit the dictations but occasionally words are mis-transcribed.)    Adry Galvin MD,, MD   Attending Emergency Physician       Venancio Poole MD  08/02/22 [id number]

## 2023-11-23 NOTE — Clinical Note
Pt. Agrees to address anxiety and stress through therapy with [facility] [place]. Pt reported not being interested in taking mental health medication at this time. She may consider mental health meds in the future.
Liveborn

## 2024-05-07 ENCOUNTER — HOSPITAL ENCOUNTER (EMERGENCY)
Facility: CLINIC | Age: 29
Discharge: HOME | End: 2024-05-07
Attending: EMERGENCY MEDICINE
Payer: MEDICAID

## 2024-05-07 ENCOUNTER — APPOINTMENT (OUTPATIENT)
Dept: ULTRASOUND IMAGING | Facility: CLINIC | Age: 29
End: 2024-05-07
Payer: MEDICAID

## 2024-05-07 VITALS
BODY MASS INDEX: 27.64 KG/M2 | OXYGEN SATURATION: 100 % | HEART RATE: 90 BPM | TEMPERATURE: 98.6 F | DIASTOLIC BLOOD PRESSURE: 93 MMHG | WEIGHT: 172 LBS | HEIGHT: 66 IN | RESPIRATION RATE: 18 BRPM | SYSTOLIC BLOOD PRESSURE: 145 MMHG

## 2024-05-07 DIAGNOSIS — Z3A.01 LESS THAN 8 WEEKS GESTATION OF PREGNANCY: Primary | ICD-10-CM

## 2024-05-07 LAB — B-HCG SERPL EIA 3RD IS-ACNC: 1801 MIU/ML

## 2024-05-07 PROCEDURE — 76801 OB US < 14 WKS SINGLE FETUS: CPT

## 2024-05-07 PROCEDURE — 84702 CHORIONIC GONADOTROPIN TEST: CPT

## 2024-05-07 PROCEDURE — 76817 TRANSVAGINAL US OBSTETRIC: CPT

## 2024-05-07 PROCEDURE — 36415 COLL VENOUS BLD VENIPUNCTURE: CPT

## 2024-05-07 PROCEDURE — 99284 EMERGENCY DEPT VISIT MOD MDM: CPT

## 2024-05-07 ASSESSMENT — PAIN SCALES - GENERAL: PAINLEVEL_OUTOF10: 5

## 2024-05-07 ASSESSMENT — PAIN DESCRIPTION - ORIENTATION: ORIENTATION: LEFT

## 2024-05-07 ASSESSMENT — ENCOUNTER SYMPTOMS
SORE THROAT: 0
SHORTNESS OF BREATH: 0
DIARRHEA: 0
VOMITING: 0

## 2024-05-07 ASSESSMENT — PAIN DESCRIPTION - DESCRIPTORS: DESCRIPTORS: SHARP

## 2024-05-07 ASSESSMENT — PAIN - FUNCTIONAL ASSESSMENT: PAIN_FUNCTIONAL_ASSESSMENT: 0-10

## 2024-05-07 ASSESSMENT — PAIN DESCRIPTION - LOCATION: LOCATION: PELVIS

## 2024-05-07 NOTE — ED PROVIDER NOTES
[facility]  [address] [facility] [address]  Phone: [phone]        ADDENDUM:      Care of this patient was assumed from Dr. Craft.  The patient was seen for Ectopic Pregnancy (Pt presents to ED with complaints of abdominal discomfort to lt pelvic region. Pt reports having a recent US and being told she has a potential ectopic. Pt reports being seen in a clinic and then was seen at [place] [address] for the same. Pt states she was informed that her HCG level is not rising appropriately. Pt states she came to this facility because \"I dont want to have to wait to be seen.\")  .  The patient's initial evaluation and plan have been discussed with the prior provider who initially evaluated the patient.  Nursing Notes, Past Medical Hx, Past Surgical Hx, Allergies, were all reviewed.    PAST MEDICAL HISTORY    has a past medical history of Mild intermittent asthma without complication.    SURGICAL HISTORY      has a past surgical history that includes Tonsillectomy and [place] tooth extraction.    CURRENT MEDICATIONS       Discharge Medication List as of 5/7/2024  8:39 PM        CONTINUE these medications which have NOT CHANGED    Details   albuterol sulfate HFA (VENTOLIN HFA) 108 (90 Base) MCG/ACT inhaler Inhale 2 puffs into the lungs 4 times daily as needed for Wheezing, Disp-18 g, R-0Normal      Spacer/Aero-Holding Chambers CHAITANYA DAILY PRN Starting Wed 1/26/2022, Disp-1 each, R-0, Normal             ALLERGIES     has No Known Allergies.      Diagnostic Results     LABS:   Results for orders placed or performed during the hospital encounter of 05/07/24   HCG, Quantitative, Pregnancy   Result Value Ref Range    hCG Quant 1,801.0 (H) <5 mIU/mL       RADIOLOGY:  US OB TRANSVAGINAL   Preliminary Result   Gestational sac noted which likely represents early pregnancy.      No yolk sac or fetal pole seen at this time.      Normal right ovary with normal vascularity.  Left ovary demonstrates a 2.4 cm   cyst.

## 2024-05-07 NOTE — ED PROVIDER NOTES
[facility]  [address] [address]  Phone: [phone]        [facility] ED  EMERGENCY DEPARTMENT ENCOUNTER      Pt Name: Manasa Miguel  MRN: [id number]  Birthdate 1995  Date of evaluation: 5/7/2024  Provider: Tony Craft DO    CHIEF COMPLAINT       Chief Complaint   Patient presents with    Ectopic Pregnancy     Pt presents to ED with complaints of abdominal discomfort to lt pelvic region. Pt reports having a recent US and being told she has a potential ectopic. Pt reports being seen in a clinic and then was seen at [facility] x2 for the same. Pt states she was informed that her HCG level is not rising appropriately. Pt states she came to this facility because \"I dont want to have to wait to be seen.\"         HISTORY OF PRESENT ILLNESS   (Location/Symptom, Timing/Onset,Context/Setting, Quality, Duration, Modifying Factors, Severity)  Note limiting factors.   Manasa Miguel is a 29 y.o. female who presents to the emergency department for reevaluation of possible ectopic pregnancy.  She had abdominal pain on May 2 and went to [facility] ER, she had a positive pregnancy test and a quantitative hCG of 393.  They did an ultrasound that was inconclusive, there was no gestational sac or fetal pole.  Patient followed up for repeat blood test on May 4 and her quantitative hCG was 904.  She reports she was contacted by an OB/GYN and apparently they were going to continue to monitor, she did not have an appointment scheduled, however, the patient came to the ER today because she says she does not quite understand if she has an ectopic or not and she is trying to get definitive answers.  She has some intermittent left lower quadrant cramping but has not had any vaginal bleeding since May 2.  This is her fourth pregnancy and she has 3 living children.    Nursing Notes were reviewed.    REVIEW OF SYSTEMS    (2-9systems for level 4, 10 or more for level 5)

## 2024-05-08 NOTE — DISCHARGE INSTRUCTIONS
Take your medication as indicated and prescribed.  Avoid drinking alcohol or drinks that have caffeine it.  Drink plenty of water or fluids like Gatorade to keep yourself hydrated.  You should eat bland foods like bananas, rice, apple sauce and toast / crackers.    You can start taking Vitamin B6 or using Holly (250 mg 4 times daily) to help with the pregnancy related nausea.  Diphenhydramine (Benadryl) may also be beneficial, you can take 25 - 50 mg (1 - 2 tablets) every 6 hours.      PLEASE RETURN TO THE EMERGENCY DEPARTMENT IMMEDIATELY for worsening symptoms, abdominal pain, blood in your stool, vomiting up blood, persistent nausea and/or vomiting, or if you develop any concerning symptoms such as: high fever not relieved by acetaminophen (Tylenol) and/or ibuprofen (Motrin / Advil), chills, shortness of breath, chest pain, feeling of your heart fluttering or racing, loss of consciousness, numbness, weakness or tingling in the arms or legs or change in color of the extremities, changes in mental status, persistent headache, blurry vision, loss of bladder / bowel control, unable to follow up with your physician, or other any other care or concern.

## 2024-11-22 ENCOUNTER — OFFICE VISIT (OUTPATIENT)
Dept: FAMILY MEDICINE CLINIC | Age: 29
End: 2024-11-22

## 2024-11-22 VITALS
WEIGHT: 206.2 LBS | HEART RATE: 79 BPM | BODY MASS INDEX: 33.14 KG/M2 | DIASTOLIC BLOOD PRESSURE: 88 MMHG | RESPIRATION RATE: 14 BRPM | OXYGEN SATURATION: 99 % | TEMPERATURE: 98.3 F | SYSTOLIC BLOOD PRESSURE: 122 MMHG | HEIGHT: 66 IN

## 2024-11-22 DIAGNOSIS — R63.8 INCREASED BMI: ICD-10-CM

## 2024-11-22 DIAGNOSIS — O99.810 ABNORMAL MATERNAL GLUCOSE TOLERANCE, ANTEPARTUM: ICD-10-CM

## 2024-11-22 DIAGNOSIS — Z28.21 INFLUENZA VACCINATION DECLINED: ICD-10-CM

## 2024-11-22 DIAGNOSIS — F17.200 SMOKER: ICD-10-CM

## 2024-11-22 DIAGNOSIS — F41.9 ANXIETY: Primary | ICD-10-CM

## 2024-11-22 DIAGNOSIS — J45.20 MILD INTERMITTENT ASTHMA WITHOUT COMPLICATION: ICD-10-CM

## 2024-11-22 PROBLEM — R00.0 TACHYCARDIA: Status: ACTIVE | Noted: 2021-01-11

## 2024-11-22 PROBLEM — R51.9 HEADACHE IN PREGNANCY, ANTEPARTUM, THIRD TRIMESTER: Status: RESOLVED | Noted: 2021-06-02 | Resolved: 2024-11-22

## 2024-11-22 PROBLEM — R94.31 ABNORMAL ECG: Status: RESOLVED | Noted: 2021-01-11 | Resolved: 2024-11-22

## 2024-11-22 PROBLEM — R94.31 ABNORMAL ECG: Status: ACTIVE | Noted: 2021-01-11

## 2024-11-22 PROBLEM — O26.893 HEADACHE IN PREGNANCY, ANTEPARTUM, THIRD TRIMESTER: Status: RESOLVED | Noted: 2021-06-02 | Resolved: 2024-11-22

## 2024-11-22 PROBLEM — Z53.20 PATIENT DECLINES TO TAKE MEDICATION: Status: RESOLVED | Noted: 2022-01-26 | Resolved: 2024-11-22

## 2024-11-22 PROBLEM — R51.9 HEADACHE IN PREGNANCY, ANTEPARTUM, THIRD TRIMESTER: Status: ACTIVE | Noted: 2021-06-02

## 2024-11-22 PROBLEM — R00.0 TACHYCARDIA: Status: RESOLVED | Noted: 2021-01-11 | Resolved: 2024-11-22

## 2024-11-22 PROBLEM — O09.90 PREGNANCY, SUPERVISION, HIGH-RISK: Status: ACTIVE | Noted: 2021-04-06

## 2024-11-22 PROBLEM — O26.893 HEADACHE IN PREGNANCY, ANTEPARTUM, THIRD TRIMESTER: Status: ACTIVE | Noted: 2021-06-02

## 2024-11-22 PROBLEM — O09.90 PREGNANCY, SUPERVISION, HIGH-RISK: Status: RESOLVED | Noted: 2021-04-06 | Resolved: 2024-11-22

## 2024-11-22 RX ORDER — ALBUTEROL SULFATE 90 UG/1
2 INHALANT RESPIRATORY (INHALATION) 4 TIMES DAILY PRN
Qty: 18 G | Refills: 5 | Status: SHIPPED | OUTPATIENT
Start: 2024-11-22

## 2024-11-22 RX ORDER — BUPROPION HYDROCHLORIDE 150 MG/1
150 TABLET ORAL EVERY MORNING
Qty: 90 TABLET | Refills: 0 | Status: SHIPPED | OUTPATIENT
Start: 2024-11-22

## 2024-11-22 RX ORDER — BUSPIRONE HYDROCHLORIDE 10 MG/1
10 TABLET ORAL 2 TIMES DAILY
Qty: 120 TABLET | Refills: 1 | Status: SHIPPED | OUTPATIENT
Start: 2024-11-22

## 2024-11-22 RX ORDER — NICOTINE 21 MG/24HR
1 PATCH, TRANSDERMAL 24 HOURS TRANSDERMAL DAILY
Qty: 42 PATCH | Refills: 0 | Status: SHIPPED | OUTPATIENT
Start: 2024-11-22 | End: 2025-01-03

## 2024-11-22 SDOH — ECONOMIC STABILITY: FOOD INSECURITY: WITHIN THE PAST 12 MONTHS, YOU WORRIED THAT YOUR FOOD WOULD RUN OUT BEFORE YOU GOT MONEY TO BUY MORE.: NEVER TRUE

## 2024-11-22 SDOH — ECONOMIC STABILITY: FOOD INSECURITY: WITHIN THE PAST 12 MONTHS, THE FOOD YOU BOUGHT JUST DIDN'T LAST AND YOU DIDN'T HAVE MONEY TO GET MORE.: NEVER TRUE

## 2024-11-22 SDOH — ECONOMIC STABILITY: INCOME INSECURITY: HOW HARD IS IT FOR YOU TO PAY FOR THE VERY BASICS LIKE FOOD, HOUSING, MEDICAL CARE, AND HEATING?: NOT HARD AT ALL

## 2024-11-22 ASSESSMENT — ENCOUNTER SYMPTOMS
EYES NEGATIVE: 1
SHORTNESS OF BREATH: 1
GASTROINTESTINAL NEGATIVE: 1
ALLERGIC/IMMUNOLOGIC NEGATIVE: 1
COUGH: 1

## 2024-11-22 ASSESSMENT — PATIENT HEALTH QUESTIONNAIRE - PHQ9
1. LITTLE INTEREST OR PLEASURE IN DOING THINGS: NOT AT ALL
SUM OF ALL RESPONSES TO PHQ QUESTIONS 1-9: 0
SUM OF ALL RESPONSES TO PHQ QUESTIONS 1-9: 0
SUM OF ALL RESPONSES TO PHQ9 QUESTIONS 1 & 2: 0
SUM OF ALL RESPONSES TO PHQ QUESTIONS 1-9: 0
SUM OF ALL RESPONSES TO PHQ QUESTIONS 1-9: 0
2. FEELING DOWN, DEPRESSED OR HOPELESS: NOT AT ALL

## 2024-11-22 NOTE — PROGRESS NOTES
Skin:     General: Skin is warm and dry.   Neurological:      Mental Status: She is alert and oriented to person, place, and time.      Deep Tendon Reflexes: Reflexes are normal and symmetric.   Psychiatric:      Comments: Anxiety/depression  Hyperventilation syndrome  Panic attack         Assessment:       Diagnosis Orders   1. Anxiety  [facility] ([facility])      2. Smoker  [facility] ([facility])      3. Influenza vaccination declined        4. Mild intermittent asthma without complication        5. Increased BMI        6. Abnormal maternal glucose tolerance, antepartum                Plan:   Assessment & Plan   29-year-old female is presented for follow-up.  She is afebrile hemodynamically stable  Underlying history of anxiety/depression.  Patient states that she recently she lost a few members including 14-year-old brother.  She has custody of 2 brothers and taking care of 2 of her children.  She has good support from her spouse.  She denies suicidality, delusion or hallucination.  She agrees to be seen by [facility] behavioral services.  I placed her on BuSpar and Wellbutrin  Stress smoker.  She is counseled.  She will benefit from Wellbutrin.  I have also ordered nicotine patch  Increased BMI.  History of abnormal GTT.  Currently she is euglycemic.  She would benefit from low-fat high-fiber diet, daily moderate exercise, lifestyle change and weight reduction to keep BMI below 25  She is once again reluctant to age-appropriate vaccinations  She is advised to update Pap and breast exam with her GYN  Med list reviewed advised to continue  Available labs reviewed, discussed with patient, question answered.  Reassurance provided  Follow-up in 3 months  This note is created with a voice recognition program and while intend to generate a document that accurately reflects the content of the visit, no guarantee can be provided that every mistake has been identified and

## 2024-12-16 ENCOUNTER — TELEMEDICINE (OUTPATIENT)
Dept: BEHAVIORAL/MENTAL HEALTH CLINIC | Age: 29
End: 2024-12-16
Payer: MEDICAID

## 2024-12-16 DIAGNOSIS — F43.81 PROLONGED GRIEF DISORDER: ICD-10-CM

## 2024-12-16 DIAGNOSIS — F43.10 POST TRAUMATIC STRESS DISORDER: Primary | ICD-10-CM

## 2024-12-16 PROCEDURE — 90791 PSYCH DIAGNOSTIC EVALUATION: CPT | Performed by: COUNSELOR

## 2024-12-16 ASSESSMENT — PATIENT HEALTH QUESTIONNAIRE - PHQ9
9. THOUGHTS THAT YOU WOULD BE BETTER OFF DEAD, OR OF HURTING YOURSELF: NOT AT ALL
1. LITTLE INTEREST OR PLEASURE IN DOING THINGS: NOT AT ALL
1. LITTLE INTEREST OR PLEASURE IN DOING THINGS: NOT AT ALL
SUM OF ALL RESPONSES TO PHQ QUESTIONS 1-9: 5
SUM OF ALL RESPONSES TO PHQ QUESTIONS 1-9: 5
7. TROUBLE CONCENTRATING ON THINGS, SUCH AS READING THE NEWSPAPER OR WATCHING TELEVISION: NOT AT ALL
6. FEELING BAD ABOUT YOURSELF - OR THAT YOU ARE A FAILURE OR HAVE LET YOURSELF OR YOUR FAMILY DOWN: SEVERAL DAYS
10. IF YOU CHECKED OFF ANY PROBLEMS, HOW DIFFICULT HAVE THESE PROBLEMS MADE IT FOR YOU TO DO YOUR WORK, TAKE CARE OF THINGS AT HOME, OR GET ALONG WITH OTHER PEOPLE: SOMEWHAT DIFFICULT
2. FEELING DOWN, DEPRESSED OR HOPELESS: SEVERAL DAYS
SUM OF ALL RESPONSES TO PHQ9 QUESTIONS 1 & 2: 1
3. TROUBLE FALLING OR STAYING ASLEEP: SEVERAL DAYS
3. TROUBLE FALLING OR STAYING ASLEEP: SEVERAL DAYS
7. TROUBLE CONCENTRATING ON THINGS, SUCH AS READING THE NEWSPAPER OR WATCHING TELEVISION: NOT AT ALL
4. FEELING TIRED OR HAVING LITTLE ENERGY: SEVERAL DAYS
SUM OF ALL RESPONSES TO PHQ QUESTIONS 1-9: 5
6. FEELING BAD ABOUT YOURSELF - OR THAT YOU ARE A FAILURE OR HAVE LET YOURSELF OR YOUR FAMILY DOWN: SEVERAL DAYS
2. FEELING DOWN, DEPRESSED OR HOPELESS: SEVERAL DAYS
5. POOR APPETITE OR OVEREATING: SEVERAL DAYS
9. THOUGHTS THAT YOU WOULD BE BETTER OFF DEAD, OR OF HURTING YOURSELF: NOT AT ALL
SUM OF ALL RESPONSES TO PHQ QUESTIONS 1-9: 5
8. MOVING OR SPEAKING SO SLOWLY THAT OTHER PEOPLE COULD HAVE NOTICED. OR THE OPPOSITE - BEING SO FIDGETY OR RESTLESS THAT YOU HAVE BEEN MOVING AROUND A LOT MORE THAN USUAL: NOT AT ALL
5. POOR APPETITE OR OVEREATING: SEVERAL DAYS
SUM OF ALL RESPONSES TO PHQ QUESTIONS 1-9: 5
8. MOVING OR SPEAKING SO SLOWLY THAT OTHER PEOPLE COULD HAVE NOTICED. OR THE OPPOSITE, BEING SO FIGETY OR RESTLESS THAT YOU HAVE BEEN MOVING AROUND A LOT MORE THAN USUAL: NOT AT ALL
10. IF YOU CHECKED OFF ANY PROBLEMS, HOW DIFFICULT HAVE THESE PROBLEMS MADE IT FOR YOU TO DO YOUR WORK, TAKE CARE OF THINGS AT HOME, OR GET ALONG WITH OTHER PEOPLE: SOMEWHAT DIFFICULT
4. FEELING TIRED OR HAVING LITTLE ENERGY: SEVERAL DAYS

## 2024-12-16 ASSESSMENT — ANXIETY QUESTIONNAIRES
1. FEELING NERVOUS, ANXIOUS, OR ON EDGE: SEVERAL DAYS
6. BECOMING EASILY ANNOYED OR IRRITABLE: SEVERAL DAYS
3. WORRYING TOO MUCH ABOUT DIFFERENT THINGS: SEVERAL DAYS
4. TROUBLE RELAXING: SEVERAL DAYS
4. TROUBLE RELAXING: SEVERAL DAYS
2. NOT BEING ABLE TO STOP OR CONTROL WORRYING: SEVERAL DAYS
1. FEELING NERVOUS, ANXIOUS, OR ON EDGE: SEVERAL DAYS
IF YOU CHECKED OFF ANY PROBLEMS ON THIS QUESTIONNAIRE, HOW DIFFICULT HAVE THESE PROBLEMS MADE IT FOR YOU TO DO YOUR WORK, TAKE CARE OF THINGS AT HOME, OR GET ALONG WITH OTHER PEOPLE: SOMEWHAT DIFFICULT
3. WORRYING TOO MUCH ABOUT DIFFERENT THINGS: SEVERAL DAYS
7. FEELING AFRAID AS IF SOMETHING AWFUL MIGHT HAPPEN: SEVERAL DAYS
5. BEING SO RESTLESS THAT IT IS HARD TO SIT STILL: NOT AT ALL
7. FEELING AFRAID AS IF SOMETHING AWFUL MIGHT HAPPEN: SEVERAL DAYS
IF YOU CHECKED OFF ANY PROBLEMS ON THIS QUESTIONNAIRE, HOW DIFFICULT HAVE THESE PROBLEMS MADE IT FOR YOU TO DO YOUR WORK, TAKE CARE OF THINGS AT HOME, OR GET ALONG WITH OTHER PEOPLE: SOMEWHAT DIFFICULT
5. BEING SO RESTLESS THAT IT IS HARD TO SIT STILL: NOT AT ALL
GAD7 TOTAL SCORE: 6
6. BECOMING EASILY ANNOYED OR IRRITABLE: SEVERAL DAYS
2. NOT BEING ABLE TO STOP OR CONTROL WORRYING: SEVERAL DAYS

## 2024-12-16 NOTE — PROGRESS NOTES
ADULT BEHAVIORAL HEALTH   Manasa Miguel, was evaluated through a synchronous (real-time) audio-video encounter. The patient (or guardian if applicable) is aware that this is a billable service, which includes applicable co-pays. This Virtual Visit was conducted with patient's (and/or legal guardian's) consent. Patient identification was verified, and a caregiver was present when appropriate.   The patient was located at Home: [address] [place] [address]  Provider was located at Facility (Appt Dept): [facility]  [address]  [place] [address]  Confirm you are appropriately licensed, registered, or certified to deliver care in the state where the patient is located as indicated above. If you are not or unsure, please re-schedule the visit: Yes, I confirm.        Total time spent for this encounter:  55 minutes    --Lilliana Shah, [facility] on 2024 at 1:56 PM    An electronic signature was used to authenticate this note.      Visit Date: 2024   Time of appointment:  1:00 pm    Time spent with Patient: 55 minutes.   This is patient's Initial appointment.    Reason for Consult:    Chief Complaint   Patient presents with    Anxiety    Other     \"Loss\" and grief       Referring Provider/PCP:    Yvan Dorman MD Farooq, Amjad, MD      Pt provided informed consent for the behavioral health program. Discussed with patient model of service to include the limits of confidentiality (i.e. abuse reporting, suicide intervention, etc.) and short-term intervention focused approach. Pt indicated understanding.     PRESENTING PROBLEM AND HISTORY  Manasa is a 29 y.o. female who presents for new evaluation and treatment of anxiety, grief.  Manasa reported that she has been feeling \"anxiety attacks\" and feelings of sadness and overwhelming grief.  She reports thinking about the losses she has experienced in her life including thinking about the people that have  on a daily basis.
